# Patient Record
Sex: MALE | Race: WHITE | NOT HISPANIC OR LATINO | Employment: OTHER | URBAN - METROPOLITAN AREA
[De-identification: names, ages, dates, MRNs, and addresses within clinical notes are randomized per-mention and may not be internally consistent; named-entity substitution may affect disease eponyms.]

---

## 2018-07-09 ENCOUNTER — HOSPITAL ENCOUNTER (INPATIENT)
Facility: MEDICAL CENTER | Age: 58
LOS: 4 days | DRG: 439 | End: 2018-07-13
Attending: INTERNAL MEDICINE | Admitting: INTERNAL MEDICINE
Payer: COMMERCIAL

## 2018-07-09 PROCEDURE — A9270 NON-COVERED ITEM OR SERVICE: HCPCS | Performed by: STUDENT IN AN ORGANIZED HEALTH CARE EDUCATION/TRAINING PROGRAM

## 2018-07-09 PROCEDURE — 770006 HCHG ROOM/CARE - MED/SURG/GYN SEMI*

## 2018-07-09 PROCEDURE — 700102 HCHG RX REV CODE 250 W/ 637 OVERRIDE(OP): Performed by: STUDENT IN AN ORGANIZED HEALTH CARE EDUCATION/TRAINING PROGRAM

## 2018-07-09 PROCEDURE — 700105 HCHG RX REV CODE 258: Performed by: STUDENT IN AN ORGANIZED HEALTH CARE EDUCATION/TRAINING PROGRAM

## 2018-07-09 RX ORDER — OXYCODONE HYDROCHLORIDE 5 MG/1
5 TABLET ORAL EVERY 4 HOURS PRN
COMMUNITY
End: 2020-06-22

## 2018-07-09 RX ORDER — OXYCODONE HYDROCHLORIDE 5 MG/1
5 TABLET ORAL EVERY 4 HOURS PRN
Status: DISCONTINUED | OUTPATIENT
Start: 2018-07-09 | End: 2018-07-10

## 2018-07-09 RX ORDER — BISACODYL 10 MG
10 SUPPOSITORY, RECTAL RECTAL
Status: DISCONTINUED | OUTPATIENT
Start: 2018-07-09 | End: 2018-07-13 | Stop reason: HOSPADM

## 2018-07-09 RX ORDER — POLYETHYLENE GLYCOL 3350 17 G/17G
1 POWDER, FOR SOLUTION ORAL
Status: DISCONTINUED | OUTPATIENT
Start: 2018-07-09 | End: 2018-07-13 | Stop reason: HOSPADM

## 2018-07-09 RX ORDER — SODIUM CHLORIDE 9 MG/ML
INJECTION, SOLUTION INTRAVENOUS CONTINUOUS
Status: DISCONTINUED | OUTPATIENT
Start: 2018-07-09 | End: 2018-07-13 | Stop reason: HOSPADM

## 2018-07-09 RX ORDER — AMOXICILLIN 250 MG
2 CAPSULE ORAL 2 TIMES DAILY
Status: DISCONTINUED | OUTPATIENT
Start: 2018-07-09 | End: 2018-07-13 | Stop reason: HOSPADM

## 2018-07-09 RX ADMIN — OXYCODONE HYDROCHLORIDE 5 MG: 5 TABLET ORAL at 21:50

## 2018-07-09 RX ADMIN — SODIUM CHLORIDE: 9 INJECTION, SOLUTION INTRAVENOUS at 21:00

## 2018-07-09 ASSESSMENT — COPD QUESTIONNAIRES
DO YOU EVER COUGH UP ANY MUCUS OR PHLEGM?: YES, EVERY DAY
IN THE PAST 12 MONTHS DO YOU DO LESS THAN YOU USED TO BECAUSE OF YOUR BREATHING PROBLEMS: DISAGREE/UNSURE
DURING THE PAST 4 WEEKS HOW MUCH DID YOU FEEL SHORT OF BREATH: SOME OF THE TIME
HAVE YOU SMOKED AT LEAST 100 CIGARETTES IN YOUR ENTIRE LIFE: NO/DON'T KNOW
COPD SCREENING SCORE: 4

## 2018-07-09 ASSESSMENT — COGNITIVE AND FUNCTIONAL STATUS - GENERAL
MOBILITY SCORE: 24
SUGGESTED CMS G CODE MODIFIER DAILY ACTIVITY: CH
SUGGESTED CMS G CODE MODIFIER MOBILITY: CH
DAILY ACTIVITIY SCORE: 24

## 2018-07-09 ASSESSMENT — LIFESTYLE VARIABLES
ALCOHOL_USE: NO
EVER_SMOKED: YES
EVER_SMOKED: YES

## 2018-07-09 ASSESSMENT — PATIENT HEALTH QUESTIONNAIRE - PHQ9
2. FEELING DOWN, DEPRESSED, IRRITABLE, OR HOPELESS: NOT AT ALL
SUM OF ALL RESPONSES TO PHQ9 QUESTIONS 1 AND 2: 0
1. LITTLE INTEREST OR PLEASURE IN DOING THINGS: NOT AT ALL

## 2018-07-09 ASSESSMENT — PAIN SCALES - GENERAL
PAINLEVEL_OUTOF10: 8
PAINLEVEL_OUTOF10: 6

## 2018-07-09 NOTE — PROGRESS NOTES
Direct admit from [facility]. Accepted by Dr. Cunningham ([facility] team) for acute on chronic pancreatitis.  NO ADT ENTERED. PLEASE CALL R BLUE/BERG TEAM ON ARRIVAL FOR ORDERS. Pt coming by ground.

## 2018-07-10 PROBLEM — K86.1 ACUTE ON CHRONIC PANCREATITIS (HCC): Status: ACTIVE | Noted: 2018-07-10

## 2018-07-10 PROBLEM — G89.29 CHRONIC BACK PAIN: Status: ACTIVE | Noted: 2018-07-10

## 2018-07-10 PROBLEM — K85.90 ACUTE ON CHRONIC PANCREATITIS (HCC): Status: ACTIVE | Noted: 2018-07-10

## 2018-07-10 PROBLEM — R10.11 ABDOMINAL PAIN, CHRONIC, RIGHT UPPER QUADRANT: Status: ACTIVE | Noted: 2018-07-10

## 2018-07-10 PROBLEM — M54.9 CHRONIC BACK PAIN: Status: ACTIVE | Noted: 2018-07-10

## 2018-07-10 PROBLEM — G89.29 ABDOMINAL PAIN, CHRONIC, RIGHT UPPER QUADRANT: Status: ACTIVE | Noted: 2018-07-10

## 2018-07-10 LAB
ALBUMIN SERPL BCP-MCNC: 4.6 G/DL (ref 3.2–4.9)
ALBUMIN/GLOB SERPL: 1.7 G/DL
ALP SERPL-CCNC: 85 U/L (ref 30–99)
ALT SERPL-CCNC: 42 U/L (ref 2–50)
ANION GAP SERPL CALC-SCNC: 10 MMOL/L (ref 0–11.9)
AST SERPL-CCNC: 40 U/L (ref 12–45)
BILIRUB SERPL-MCNC: 0.9 MG/DL (ref 0.1–1.5)
BUN SERPL-MCNC: 13 MG/DL (ref 8–22)
CALCIUM SERPL-MCNC: 9.8 MG/DL (ref 8.5–10.5)
CHLORIDE SERPL-SCNC: 112 MMOL/L (ref 96–112)
CO2 SERPL-SCNC: 21 MMOL/L (ref 20–33)
CREAT SERPL-MCNC: 0.96 MG/DL (ref 0.5–1.4)
ERYTHROCYTE [DISTWIDTH] IN BLOOD BY AUTOMATED COUNT: 45.9 FL (ref 35.9–50)
GLOBULIN SER CALC-MCNC: 2.7 G/DL (ref 1.9–3.5)
GLUCOSE SERPL-MCNC: 84 MG/DL (ref 65–99)
HCT VFR BLD AUTO: 50.3 % (ref 42–52)
HGB BLD-MCNC: 16.2 G/DL (ref 14–18)
LIPASE SERPL-CCNC: 270 U/L (ref 11–82)
MCH RBC QN AUTO: 30.5 PG (ref 27–33)
MCHC RBC AUTO-ENTMCNC: 32.2 G/DL (ref 33.7–35.3)
MCV RBC AUTO: 94.5 FL (ref 81.4–97.8)
PLATELET # BLD AUTO: 382 K/UL (ref 164–446)
PMV BLD AUTO: 10.3 FL (ref 9–12.9)
POTASSIUM SERPL-SCNC: 4.3 MMOL/L (ref 3.6–5.5)
PROT SERPL-MCNC: 7.3 G/DL (ref 6–8.2)
RBC # BLD AUTO: 5.32 M/UL (ref 4.7–6.1)
SODIUM SERPL-SCNC: 143 MMOL/L (ref 135–145)
WBC # BLD AUTO: 11 K/UL (ref 4.8–10.8)

## 2018-07-10 PROCEDURE — 700102 HCHG RX REV CODE 250 W/ 637 OVERRIDE(OP): Performed by: STUDENT IN AN ORGANIZED HEALTH CARE EDUCATION/TRAINING PROGRAM

## 2018-07-10 PROCEDURE — A9270 NON-COVERED ITEM OR SERVICE: HCPCS | Performed by: STUDENT IN AN ORGANIZED HEALTH CARE EDUCATION/TRAINING PROGRAM

## 2018-07-10 PROCEDURE — 700111 HCHG RX REV CODE 636 W/ 250 OVERRIDE (IP)

## 2018-07-10 PROCEDURE — 770006 HCHG ROOM/CARE - MED/SURG/GYN SEMI*

## 2018-07-10 PROCEDURE — 36415 COLL VENOUS BLD VENIPUNCTURE: CPT

## 2018-07-10 PROCEDURE — 99222 1ST HOSP IP/OBS MODERATE 55: CPT | Mod: GC | Performed by: INTERNAL MEDICINE

## 2018-07-10 PROCEDURE — 85027 COMPLETE CBC AUTOMATED: CPT

## 2018-07-10 PROCEDURE — 94760 N-INVAS EAR/PLS OXIMETRY 1: CPT

## 2018-07-10 PROCEDURE — 83690 ASSAY OF LIPASE: CPT

## 2018-07-10 PROCEDURE — 700111 HCHG RX REV CODE 636 W/ 250 OVERRIDE (IP): Performed by: STUDENT IN AN ORGANIZED HEALTH CARE EDUCATION/TRAINING PROGRAM

## 2018-07-10 PROCEDURE — 700105 HCHG RX REV CODE 258: Performed by: STUDENT IN AN ORGANIZED HEALTH CARE EDUCATION/TRAINING PROGRAM

## 2018-07-10 PROCEDURE — 80053 COMPREHEN METABOLIC PANEL: CPT

## 2018-07-10 RX ORDER — ONDANSETRON 2 MG/ML
INJECTION INTRAMUSCULAR; INTRAVENOUS
Status: COMPLETED
Start: 2018-07-10 | End: 2018-07-10

## 2018-07-10 RX ORDER — OXYCODONE HYDROCHLORIDE 10 MG/1
10 TABLET ORAL EVERY 4 HOURS PRN
Status: DISCONTINUED | OUTPATIENT
Start: 2018-07-10 | End: 2018-07-13 | Stop reason: HOSPADM

## 2018-07-10 RX ORDER — ONDANSETRON 2 MG/ML
4 INJECTION INTRAMUSCULAR; INTRAVENOUS EVERY 4 HOURS PRN
Status: DISCONTINUED | OUTPATIENT
Start: 2018-07-10 | End: 2018-07-13 | Stop reason: HOSPADM

## 2018-07-10 RX ADMIN — ONDANSETRON HYDROCHLORIDE: 2 INJECTION, SOLUTION INTRAMUSCULAR; INTRAVENOUS at 16:30

## 2018-07-10 RX ADMIN — OXYCODONE HYDROCHLORIDE 5 MG: 5 TABLET ORAL at 02:12

## 2018-07-10 RX ADMIN — OXYCODONE HYDROCHLORIDE 10 MG: 10 TABLET ORAL at 13:07

## 2018-07-10 RX ADMIN — SODIUM CHLORIDE: 9 INJECTION, SOLUTION INTRAVENOUS at 02:15

## 2018-07-10 RX ADMIN — OXYCODONE HYDROCHLORIDE 10 MG: 10 TABLET ORAL at 08:27

## 2018-07-10 RX ADMIN — PROCHLORPERAZINE EDISYLATE 10 MG: 5 INJECTION INTRAMUSCULAR; INTRAVENOUS at 19:52

## 2018-07-10 ASSESSMENT — LIFESTYLE VARIABLES
SUBSTANCE_ABUSE: 0
EVER_SMOKED: YES

## 2018-07-10 ASSESSMENT — ENCOUNTER SYMPTOMS
SEIZURES: 0
NAUSEA: 0
BLURRED VISION: 0
VOMITING: 0
PALPITATIONS: 0
LOSS OF CONSCIOUSNESS: 0
WEIGHT LOSS: 0
SENSORY CHANGE: 0
CONSTIPATION: 0
HEADACHES: 0
CLAUDICATION: 0
COUGH: 0
DOUBLE VISION: 0
FLANK PAIN: 0
EYE DISCHARGE: 0
ORTHOPNEA: 0
SHORTNESS OF BREATH: 0
NERVOUS/ANXIOUS: 0
FALLS: 0
WHEEZING: 0
NAUSEA: 1
TREMORS: 0
PHOTOPHOBIA: 0
MYALGIAS: 0
HEMOPTYSIS: 0
DEPRESSION: 0
ABDOMINAL PAIN: 1
BLOOD IN STOOL: 0
STRIDOR: 0
SINUS PAIN: 0
WEAKNESS: 0
DIZZINESS: 0
SORE THROAT: 0
EYE PAIN: 0
VOMITING: 1
DIAPHORESIS: 0
FOCAL WEAKNESS: 0
MEMORY LOSS: 0
DIARRHEA: 0
INSOMNIA: 0
SPUTUM PRODUCTION: 0
SPEECH CHANGE: 0
TINGLING: 0
FEVER: 0
CHILLS: 0
BACK PAIN: 1
PND: 0
BRUISES/BLEEDS EASILY: 0
HEARTBURN: 0
NECK PAIN: 0
EYE REDNESS: 0
HALLUCINATIONS: 0

## 2018-07-10 ASSESSMENT — COPD QUESTIONNAIRES
COPD SCREENING SCORE: 5
DURING THE PAST 4 WEEKS HOW MUCH DID YOU FEEL SHORT OF BREATH: NONE/LITTLE OF THE TIME
DO YOU EVER COUGH UP ANY MUCUS OR PHLEGM?: YES, EVERY DAY
HAVE YOU SMOKED AT LEAST 100 CIGARETTES IN YOUR ENTIRE LIFE: YES

## 2018-07-10 ASSESSMENT — PAIN SCALES - GENERAL: PAINLEVEL_OUTOF10: 9

## 2018-07-10 NOTE — ASSESSMENT & PLAN NOTE
Acute on Chronic abdominal pain likely due to pancreatitis  - [facility]  - RUQ/epigastric pain  - MRCP done at the showed Pancreatic divisum with bile draining form minor papillae  - ERCP scheduled for 7/10/2018 for possible sphincterotomy   - NPO for Possible ERCP and pancreatitis  - Oxycodone 5 mg Q4 hrs for pain  - Consult GI  - Baseline CBC and CMP  - GI consult

## 2018-07-10 NOTE — CARE PLAN
Problem: Bowel/Gastric:  Goal: Normal bowel function is maintained or improved  Outcome: PROGRESSING AS EXPECTED  Pt is having regular bowel movements, pt declined stool softeners.    Problem: Skin Integrity  Goal: Risk for impaired skin integrity will decrease  Outcome: PROGRESSING AS EXPECTED  Pt's skin is intact, pt is able to turn himself.    Problem: Pain Management  Goal: Pain level will decrease to patient's comfort goal  Outcome: PROGRESSING AS EXPECTED  Pt reports pain to his abd and has chronic low back pain, pt receiving oxy prn, will monitor pain throughout night.

## 2018-07-10 NOTE — RESPIRATORY CARE
COPD EDUCATION by COPD CLINICAL EDUCATOR  7/10/2018 at 2:20 PM by Amber Jovel     Patient interviewed by COPD education team. Patient refused COPD program at this time.

## 2018-07-10 NOTE — PROGRESS NOTES
1959: pt very upset about requesting his cigarettes to be locked up, contacted charge nurse. Pt reporting pain to his abdomen and his lower back, no pain meds ordered, will contact doctor.    2007: pt agreed to give writer cigarettes, locked up in drawer. Pt's other belongings at bedside.    2012: paged [facility] for pain medication orders.    2033: [facility]  returned phone call, states are on way to see pt and then will order pain medication. Stated pt can have clear fluids until midnight and then NPO for ERCP tomorrow.

## 2018-07-10 NOTE — CARE PLAN
Problem: Nutritional:  Goal: Achieve adequate nutritional intake  Patient's diet will advance past clears and will consume >50% of meals  Outcome: PROGRESSING SLOWER THAN EXPECTED

## 2018-07-10 NOTE — SENIOR ADMIT NOTE
Senior Admission Note    In summary: Cheng Kline is a 57 y.o. male with past medical history of skin cancer (removed), pancreatic divisum, colonic polyps, and chronic pain that [facility] due [facility] on chronic abdominal pain and possible need for ERCP.   Patient reported pain on RUQ that sometimes is 10/10 in intensity. It radiates in straight line in the middle of abdomen. Also reported nausea and lightheadedness. No vomiting. [facility] and they recommended transfer here for ERCP.     Physical Exam   Constitutional:  oriented to person, place, and time. No distress.   HEENT: grossly normal   Cardiovascular: Normal heart rate, Normal rhythm   Lungs: Respiratory effort is normal. Normal breath sounds  Abdomen: Bowel sounds normal,Tenderness on right upper and lower quadrants   Skin: No erythema, No rash  Lower limbs: normal, no pitting edema   Neurologic: Alert & oriented x 3,  No focal deficits noted, cranial nerves II through XII are normal  PSY: anxious          Assessment and plan in summary:    #Acute on chronic abdominal pain   - [facility] and GI evaluated him and recommended transfer to [facility] for ERCP.   - MRCP done that showed Pancreatic divisum   - lipase at the    Plan   - admitted   - consult GI in the morning for ERCP   - pain management   - IV fluids   - NPO at midnight   - labs in the morning     #Chronic back pain   - continue home medications for pain control     #Joselin/Hypomania   - [facility]   - he refuse any treatment at that time         For full plan, please see Intern note for details   Chilango Abbott M.D.  PGY 2

## 2018-07-10 NOTE — H&P
"      Internal Medicine Admitting History and Physical    Note Author: Lane Restrepo M.D.       Name Cheng Kline     1960   Age/Sex 57 y.o. male   MRN [id number]   Code Status Full code     After 5PM or if no immediate response to page, please call for cross-coverage  Attending/Team: Marisa/Blue See Patient List for primary contact information  Call [phone] to page    1st Call - Day Intern (R1):   Gregory 2nd Call - Day Sr. Resident (R2/R3):   Oniel       Chief Complaint:   Abdominal pain    HPI:  56 YO [facility] RUQ/epigastric sharp pain that started on 2018 but has been intermittent since 2018. He also describes \"tender pain\" going from sternum to umbilicus along midline. [facility] showed pancreatic divisum with bile draining through minor papilla. [facility] scheduled for tomorrow, 7/10/2018.       Review of Systems   Constitutional: Negative for chills, diaphoresis, fever, malaise/fatigue and weight loss.   HENT: Negative for congestion, ear discharge, ear pain, hearing loss, nosebleeds, sinus pain, sore throat and tinnitus.    Eyes: Negative for blurred vision, double vision, photophobia, pain, discharge and redness.   Respiratory: Negative for cough, hemoptysis, sputum production, shortness of breath, wheezing and stridor.    Cardiovascular: Negative for chest pain, palpitations, orthopnea, claudication, leg swelling and PND.   Gastrointestinal: Positive for abdominal pain. Negative for blood in stool, constipation, diarrhea, heartburn, melena, nausea and vomiting.   Genitourinary: Negative for dysuria, flank pain, frequency, hematuria and urgency.   Musculoskeletal: Positive for back pain. Negative for falls, joint pain, myalgias and neck pain.   Skin: Negative for itching and rash.   Neurological: Negative for dizziness, tingling, tremors, sensory change, speech change, focal weakness, seizures, loss " "of consciousness, weakness and headaches.   Endo/Heme/Allergies: Does not bruise/bleed easily.   Psychiatric/Behavioral: Negative for depression, hallucinations, memory loss, substance abuse and suicidal ideas. The patient is not nervous/anxious and does not have insomnia.              Past Medical History (Chronic medical problem, known complications and current treatment)    Skin Cancer-excised  Chronic back pain  Colonic polyps  Diverticulosis  Hiatal hernia      Past Surgical History:  Past Surgical History:   Procedure Laterality Date   • OTHER ORTHOPEDIC SURGERY      herniated disks L1-S1       Current Outpatient Medications:  Home Medications     Reviewed by Ale Jennings R.N. (Registered Nurse) on 07/09/18 at 1949  Med List Status: Complete   Medication Last Dose Status   methocarbamol (ROBAXIN) 500 MG Tab 6/25/2018 Active   oxyCODONE immediate-release (ROXICODONE) 5 MG Tab 7/9/2018 Active                Medication Allergy/Sensitivities:  Allergies   Allergen Reactions   • Codeine Hives   • Tylenol Unspecified     \"Tylenol makes me violent.\"   • Darvocet [Propoxyphene N-Apap] Hives   • Elavil [Amitriptyline] Anaphylaxis   • Hydrocodone Anaphylaxis         Family History (mandatory)   Family History   Problem Relation Age of Onset   • Cancer Mother      Cholangiocarcinoma   • No Known Problems Father        Social History (mandatory)   Social History     Social History   • Marital status: Single     Spouse name: N/A   • Number of children: N/A   • Years of education: N/A     Occupational History   • Not on file.     Social History Main Topics   • Smoking status: Former Smoker     Types: Cigarettes   • Smokeless tobacco: Not on file   • Alcohol use No   • Drug use: Yes      Comment: marijuana   • Sexual activity: Not on file     Other Topics Concern   • Not on file     Social History Narrative   • No narrative on file     Living situation:  PCP : Pcp Pt States None    Physical Exam     Vitals:    07/09/18 " "1800 07/09/18 2001 07/10/18 0000   BP:  143/75 136/70   Pulse:  69 64   Resp:  14 14   Temp:  36.7 °C (98.1 °F) 37.1 °C (98.8 °F)   SpO2:  95% 95%   Weight: 95.4 kg (210 lb 5.1 oz)     Height: 1.803 m (5' 11\")       Body mass index is 29.33 kg/m².  /70   Pulse 64   Temp 37.1 °C (98.8 °F)   Resp 14   Ht 1.803 m (5' 11\")   Wt 95.4 kg (210 lb 5.1 oz)   SpO2 95%   BMI 29.33 kg/m²   O2 therapy: Pulse Oximetry: 95 %, O2 (LPM): 0, O2 Delivery: None (Room Air)    Physical Exam   Constitutional: He is oriented to person, place, and time and well-developed, well-nourished, and in no distress. No distress.   HENT:   Head: Normocephalic and atraumatic.   Mouth/Throat: Oropharynx is clear and moist. No oropharyngeal exudate.   Eyes: Conjunctivae and EOM are normal. Pupils are equal, round, and reactive to light. Right eye exhibits no discharge. Left eye exhibits no discharge. No scleral icterus.   Neck: Normal range of motion. Neck supple. No JVD present. No tracheal deviation present. No thyromegaly present.   Cardiovascular: Normal rate, regular rhythm, normal heart sounds and intact distal pulses.  Exam reveals no gallop and no friction rub.    No murmur heard.  Pulmonary/Chest: Effort normal and breath sounds normal. No stridor. No respiratory distress. He has no wheezes. He has no rales. He exhibits no tenderness.   Abdominal: Soft. Bowel sounds are normal. He exhibits no distension and no mass. There is tenderness. There is no rebound and no guarding.   Musculoskeletal: Normal range of motion. He exhibits no edema, tenderness or deformity.   Lymphadenopathy:     He has no cervical adenopathy.   Neurological: He is alert and oriented to person, place, and time.   Skin: Skin is warm and dry. No rash noted. He is not diaphoretic. No erythema. No pallor.   Psychiatric: Mood and memory normal.   Tangental speech         Data Review       Old Records Request:   Completed  Current Records review/summary: " "Completed    Lab Data Review:  No results found for this or any previous visit (from the past 24 hour(s)).    Imaging/Procedures Review:    Independant Imaging Review: Completed  No orders to display            EKG:   EKG Independant Review: Completed  QTc:, HR:, Normal Sinus Rhythm, no ST/T changes     Records reviewed and summarized in current documentation :  Yes  [facility] teaching service handout given to patient:  No         Assessment/Plan     Chronic back pain   Assessment & Plan    Pt reports of having persistent back pain for many years  - He reports being seen by orthopedics and they recommended \"steroid\" injections  - He tried it once and the pain has gotten         Abdominal pain, chronic, right upper quadrant   Assessment & Plan    Acute on Chronic abdominal pain likely due to pancreatitis  - [facility]  - RUQ/epigastric pain  - MRCP done at the showed Pancreatic divisum with bile draining form minor papillae  - ERCP scheduled for 7/10/2018 for possible sphincterotomy   - NPO for Possible ERCP and pancreatitis  - Oxycodone 5 mg Q4 hrs for pain  - Consult GI  - Baseline CBC and CMP  - GI consult            Anticipated Hospital stay:  >2 midnights        Quality Measures  Quality-Core Measures   Reviewed items::  Labs reviewed, Medications reviewed and Radiology images reviewed  Multani catheter::  No Multani    PCP: Pcp Pt States None    "

## 2018-07-10 NOTE — DIETARY
Nutrition Services: Day 1 of admit.  Cheng Kline is a 57 y.o. male with admitting DX of acute on chronic pancreatitis, Acute pancreatitis  Consult received for Poor PO on Nutrition Admit Screen     Pt states he tolerated the clear liquids well this morning for breakfast. Pt states he has been NPO 4 out the last 7 days. Pt unsure of wt loss or UBW, however pt stated his clothes fit the same.     Assessment:  Ht: 180.3 cm, Wt 7/9: 95.4 kg via bed scale, BMI: 29.33  Diet/Intake: Clear Liquids, No Red Foods - No PO documented in chart yet.     Evaluation:   1. Meds: pericolace (refused)  2. Last BM: 7/9    Recommendations/Plan:  1. Advance diet past clears when feasible.    2. Encourage intake of meals  3. Document intake of all meals as % taken in ADL's to provide interdisciplinary communication across all shifts.   4. Monitor weight.  5. Nutrition rep will continue to see patient for ongoing meal and snack preferences.  6. Obtain supplement order per RD as needed.    RD following

## 2018-07-10 NOTE — NON-PROVIDER
Internal Medicine Medical Student Note  Note Author: Bhavesh Hopper, Student    Name Cheng Kline     1960   Age/Sex 57 y.o. male   MRN [id number]   Code Status FULL             Reason for interval visit  (Principal Problem)   Abdominal pain, chronic, right upper quadrant    Interval Problem Daily Status Update  (problem status, last 24 hours, new history, new data )   Patient reports his pain has bene improving, but is still having some aches. He states he is usually given 10 mg oxycodone instead of the 5 mg he has been receiving here. He states he has otherwise been feeling better and is only upset that he has not been able to get any sleep due to being woken up frequently.        Physical Exam       Vitals:    07/10/18 0400 07/10/18 0717 07/10/18 1125 07/10/18 1219   BP: 155/85 150/65  151/76   Pulse: 69 65 72 64   Resp: 16 16 18 16   Temp: 37.1 °C (98.8 °F) 36.5 °C (97.7 °F)  36.3 °C (97.3 °F)   SpO2: 95% 96% 95% 97%   Weight:       Height:         Body mass index is 29.33 kg/m². Weight: 95.4 kg (210 lb 5.1 oz)  Oxygen Therapy:  Pulse Oximetry: 97 %, O2 (LPM): 0, FiO2%: 21 %, O2 Delivery: None (Room Air)    Physical Exam   Constitutional: He is oriented to person, place, and time and well-developed, well-nourished, and in no distress. No distress.   HENT:   Head: Normocephalic.   Eyes: Conjunctivae are normal. No scleral icterus.   Cardiovascular: Normal rate, regular rhythm, normal heart sounds and intact distal pulses.  Exam reveals no gallop and no friction rub.    No murmur heard.  Pulmonary/Chest: Effort normal and breath sounds normal. No respiratory distress. He has no wheezes. He has no rales. He exhibits no tenderness.   Abdominal: Soft. Bowel sounds are normal. He exhibits no distension. There is no tenderness. There is no guarding.   Neurological: He is alert and oriented to person, place, and time.   Skin: Skin is warm and dry. No rash (on visualized skin) noted. He is not  diaphoretic.         Assessment/Plan     #Acute on chronic abdominal pain, likely due to pancreatitis  · Patient has bene scheduled for ERCP. GI was consulted and spoken with. He states they will likely be able to do the ERCP tomorrow. Will reassess following results of ERCP and speak with patient about plan moving forward.  · PLAN:  · ERCP scheduled for tomorrow  · Continue oxycodone 10 mg q4h PRN for pain  · Will place on clear liquid diet  · Follow up with GI tomorrow regarding ERCP results    #Chronic back pain  · Patient states his pain will be controlled with the oxycodone given for his abdominal pain  · PLAN:  · Continue medications as written  · Reassess tomorrow

## 2018-07-10 NOTE — ASSESSMENT & PLAN NOTE
"Pt reports of having persistent back pain for many years  - He reports being seen by orthopedics and they recommended \"steroid\" injections  - He tried it once and the pain has gotten   - He manages it with Mariajuana and oxycodone at home  - Possible opiate dependence  "

## 2018-07-10 NOTE — CONSULTS
Gastroenterology ([facility]) Progress Note    Name Cheng Kline     1960   Age/Sex 57 y.o. male   MRN [id number]           Reason for consult  Pancreatic divisim     Impression   A 58 yo M with past medical history significant for recently diagnosed pancreatic divisim in setting of persistent abdominal symptoms and an attack of pancreatitis last year. He was evaluated initially at [facility] and was transferred to [facility] for possible endoscopic intervention. Unclear etiology for his chronic abdominal pain, suspect chronic pancreatitis, vs dysfunctional abdominal pain vs pancreatic divisim related.     Recommendations   - Check secretin enhanced MRCP  - Check lipase level   - Okay to continue clear liquid diet   - Anti-emetic as needed   - Will review results of MRCP and decide on ERCP     HPI   Mr. Kline is a 58 yo M  with a past medical history significant for degenerative disc disease, chronic back pain, colonic polyps and recently diagnosed pancreatic divisim (s/p acute pancreatitis last year), [facility]. Patient complains of chronic epigastric pain that persists since his last attack of pancreatitis last year, associated with intermittent nausea and recent attack of clear emesis but no reported hematemesis, melena, weight loss, chronic diarrhea or recent trauma. He had lap osiel back in , denies etoh intake, history of DM or DLD, admits to cigarette smoking. Workup at [facility] was significant for lipase level of 171 with last MRCP back in 2017 that revealed pancreatic divisim.      Review of Systems   Constitutional: Negative for chills and fever.   Respiratory: Negative for cough and shortness of breath.    Cardiovascular: Negative for chest pain.   Gastrointestinal: Positive for abdominal pain, nausea and vomiting. Negative for blood in stool, diarrhea and melena.   Genitourinary: Negative for dysuria.   Musculoskeletal: Positive for  back pain and joint pain.   Neurological: Negative for dizziness and loss of consciousness.   Psychiatric/Behavioral: Negative for substance abuse.     Quality Measures  Quality-Core Measures        Physical Exam       Vitals:    07/09/18 2001 07/10/18 0000 07/10/18 0400 07/10/18 0717   BP: 143/75 136/70 155/85 150/65   Pulse: 69 64 69 65   Resp: 14 14 16 16   Temp: 36.7 °C (98.1 °F) 37.1 °C (98.8 °F) 37.1 °C (98.8 °F) 36.5 °C (97.7 °F)   SpO2: 95% 95% 95% 96%   Weight:       Height:         Body mass index is 29.33 kg/m². Weight: 95.4 kg (210 lb 5.1 oz)  Oxygen Therapy:  Pulse Oximetry: 96 %, O2 (LPM): 0, O2 Delivery: None (Room Air)    Physical Exam   Constitutional: He is oriented to person, place, and time and well-developed, well-nourished, and in no distress. No distress.   HENT:   Head: Normocephalic and atraumatic.   Eyes: No scleral icterus.   Cardiovascular: Normal rate, regular rhythm and normal heart sounds.    Pulmonary/Chest: Breath sounds normal. No stridor. No respiratory distress.   Abdominal: Soft. Bowel sounds are normal. He exhibits no distension. There is tenderness. There is no guarding.   Neurological: He is alert and oriented to person, place, and time. GCS score is 15.   Skin: Skin is warm and dry. He is not diaphoretic.       Lab Data Review:      7/10/2018  11:26 AM    Recent Labs      07/10/18   0551   SODIUM  143   POTASSIUM  4.3   CHLORIDE  112   CO2  21   BUN  13   CREATININE  0.96   CALCIUM  9.8       Recent Labs      07/10/18   0551   ALTSGPT  42   ASTSGOT  40   ALKPHOSPHAT  85   TBILIRUBIN  0.9   GLUCOSE  84       Recent Labs      07/10/18   0551   RBC  5.32   HEMOGLOBIN  16.2   HEMATOCRIT  50.3   PLATELETCT  382       Recent Labs      07/10/18   0551   WBC  11.0*   ASTSGOT  40   ALTSGPT  42   ALKPHOSPHAT  85   TBILIRUBIN  0.9

## 2018-07-11 PROBLEM — Q45.3 PANCREATIC DIVISUM: Status: ACTIVE | Noted: 2018-07-11

## 2018-07-11 LAB
ALBUMIN SERPL BCP-MCNC: 4.3 G/DL (ref 3.2–4.9)
ALBUMIN/GLOB SERPL: 1.8 G/DL
ALP SERPL-CCNC: 102 U/L (ref 30–99)
ALT SERPL-CCNC: 68 U/L (ref 2–50)
ANION GAP SERPL CALC-SCNC: 9 MMOL/L (ref 0–11.9)
AST SERPL-CCNC: 53 U/L (ref 12–45)
BILIRUB SERPL-MCNC: 0.7 MG/DL (ref 0.1–1.5)
BUN SERPL-MCNC: 10 MG/DL (ref 8–22)
CALCIUM SERPL-MCNC: 9.4 MG/DL (ref 8.5–10.5)
CHLORIDE SERPL-SCNC: 112 MMOL/L (ref 96–112)
CHOLEST SERPL-MCNC: 163 MG/DL (ref 100–199)
CO2 SERPL-SCNC: 19 MMOL/L (ref 20–33)
CREAT SERPL-MCNC: 0.83 MG/DL (ref 0.5–1.4)
GLOBULIN SER CALC-MCNC: 2.4 G/DL (ref 1.9–3.5)
GLUCOSE SERPL-MCNC: 106 MG/DL (ref 65–99)
HDLC SERPL-MCNC: 34 MG/DL
LDLC SERPL CALC-MCNC: 109 MG/DL
POTASSIUM SERPL-SCNC: 4.1 MMOL/L (ref 3.6–5.5)
PROT SERPL-MCNC: 6.7 G/DL (ref 6–8.2)
SODIUM SERPL-SCNC: 140 MMOL/L (ref 135–145)
TRIGL SERPL-MCNC: 102 MG/DL (ref 0–149)

## 2018-07-11 PROCEDURE — A9270 NON-COVERED ITEM OR SERVICE: HCPCS

## 2018-07-11 PROCEDURE — 700111 HCHG RX REV CODE 636 W/ 250 OVERRIDE (IP): Performed by: INTERNAL MEDICINE

## 2018-07-11 PROCEDURE — 80061 LIPID PANEL: CPT

## 2018-07-11 PROCEDURE — 99232 SBSQ HOSP IP/OBS MODERATE 35: CPT | Mod: GC | Performed by: INTERNAL MEDICINE

## 2018-07-11 PROCEDURE — 700102 HCHG RX REV CODE 250 W/ 637 OVERRIDE(OP): Performed by: STUDENT IN AN ORGANIZED HEALTH CARE EDUCATION/TRAINING PROGRAM

## 2018-07-11 PROCEDURE — 700102 HCHG RX REV CODE 250 W/ 637 OVERRIDE(OP): Performed by: INTERNAL MEDICINE

## 2018-07-11 PROCEDURE — 80053 COMPREHEN METABOLIC PANEL: CPT

## 2018-07-11 PROCEDURE — A9270 NON-COVERED ITEM OR SERVICE: HCPCS | Performed by: INTERNAL MEDICINE

## 2018-07-11 PROCEDURE — 770006 HCHG ROOM/CARE - MED/SURG/GYN SEMI*

## 2018-07-11 PROCEDURE — A9270 NON-COVERED ITEM OR SERVICE: HCPCS | Performed by: STUDENT IN AN ORGANIZED HEALTH CARE EDUCATION/TRAINING PROGRAM

## 2018-07-11 PROCEDURE — 82787 IGG 1 2 3 OR 4 EACH: CPT | Mod: 91

## 2018-07-11 PROCEDURE — 36415 COLL VENOUS BLD VENIPUNCTURE: CPT

## 2018-07-11 PROCEDURE — 700102 HCHG RX REV CODE 250 W/ 637 OVERRIDE(OP)

## 2018-07-11 PROCEDURE — 86038 ANTINUCLEAR ANTIBODIES: CPT

## 2018-07-11 RX ORDER — PROMETHAZINE HYDROCHLORIDE 25 MG/1
TABLET ORAL
Status: ACTIVE
Start: 2018-07-11 | End: 2018-07-11

## 2018-07-11 RX ORDER — PROMETHAZINE HYDROCHLORIDE 25 MG/1
25 TABLET ORAL EVERY 6 HOURS PRN
Status: DISCONTINUED | OUTPATIENT
Start: 2018-07-11 | End: 2018-07-13 | Stop reason: HOSPADM

## 2018-07-11 RX ORDER — OMEPRAZOLE 20 MG/1
40 CAPSULE, DELAYED RELEASE ORAL DAILY
Status: DISCONTINUED | OUTPATIENT
Start: 2018-07-12 | End: 2018-07-13 | Stop reason: HOSPADM

## 2018-07-11 RX ORDER — PROMETHAZINE HYDROCHLORIDE 25 MG/1
TABLET ORAL
Status: COMPLETED
Start: 2018-07-11 | End: 2018-07-11

## 2018-07-11 RX ADMIN — PROMETHAZINE HYDROCHLORIDE 25 MG: 25 TABLET ORAL at 09:30

## 2018-07-11 RX ADMIN — OXYCODONE HYDROCHLORIDE 10 MG: 10 TABLET ORAL at 19:26

## 2018-07-11 RX ADMIN — ONDANSETRON 4 MG: 2 INJECTION, SOLUTION INTRAMUSCULAR; INTRAVENOUS at 07:10

## 2018-07-11 RX ADMIN — PROMETHAZINE HYDROCHLORIDE 25 MG: 25 TABLET ORAL at 19:25

## 2018-07-11 RX ADMIN — PANCRELIPASE 6000 UNITS: 30000; 6000; 19000 CAPSULE, DELAYED RELEASE PELLETS ORAL at 19:26

## 2018-07-11 RX ADMIN — ONDANSETRON 4 MG: 2 INJECTION, SOLUTION INTRAMUSCULAR; INTRAVENOUS at 01:07

## 2018-07-11 RX ADMIN — OXYCODONE HYDROCHLORIDE 10 MG: 10 TABLET ORAL at 23:48

## 2018-07-11 RX ADMIN — OXYCODONE HYDROCHLORIDE 10 MG: 10 TABLET ORAL at 00:33

## 2018-07-11 ASSESSMENT — ENCOUNTER SYMPTOMS
SHORTNESS OF BREATH: 0
DEPRESSION: 0
MYALGIAS: 0
DIARRHEA: 0
BLURRED VISION: 0
HEARTBURN: 0
BLOOD IN STOOL: 0
TINGLING: 0
LOSS OF CONSCIOUSNESS: 0
PALPITATIONS: 0
DIZZINESS: 0
DOUBLE VISION: 0
SPUTUM PRODUCTION: 0
FEVER: 0
CHILLS: 0
VOMITING: 1
NAUSEA: 1
HEADACHES: 0
COUGH: 0
DIZZINESS: 1
ABDOMINAL PAIN: 1
BACK PAIN: 1
HEMOPTYSIS: 0

## 2018-07-11 ASSESSMENT — PAIN SCALES - GENERAL
PAINLEVEL_OUTOF10: 9
PAINLEVEL_OUTOF10: 7

## 2018-07-11 ASSESSMENT — LIFESTYLE VARIABLES: SUBSTANCE_ABUSE: 0

## 2018-07-11 NOTE — PROGRESS NOTES
Assumed care of pt at 1900. AAOx4, RA. Clear liquid diet, no reds. Pt c/o nausea, received orders and medicated per MAR. Pt noted some relief. Updated on POC. Hourly rounding in place.

## 2018-07-11 NOTE — NON-PROVIDER
[facility] Internal Medicine Interval Note    Name Cheng Kline     1960   Age/Sex 57 y.o. male   MRN [id number]   Code Status Full     After 5PM or if no immediate response to page, please call for cross-coverage  Attending/Team: Dr. Cunningham/Jacob Call [phone] to page   1st Call - Day Intern (R1):   Dr. Jenkins 2nd Call - Day Sr. Resident (R2/R3):   Dr. Engle     Chief complaint/ reason for interval visit (Primary Diagnosis)    Raisa is a 56 yo M who [facility].     Interval Problem Daily Status Update    Active Hospital Problems    Diagnosis   • *Acute on chronic pancreatitis (HCC) [K85.90, K86.1]     Pancreatitis: History obtained after several attempts to redirect the patient to focus on abdominal pain. Patient states that he is having worsening abdominal pain in the RUQ; this has been ongoing for 1 year. Patient was frustrated with pain management as he was [facility]. Lipase was 171. Patient believes there is a hernia in the RUQ.     Review of Systems   HENT: Negative for hearing loss.    Cardiovascular: Negative for chest pain.   Gastrointestinal: Positive for abdominal pain.   Musculoskeletal: Positive for back pain and joint pain.       Consultants/Specialty  GI    Disposition  Inpatient    Quality Measures  Quality-Core Measures        Physical Exam       Vitals:    07/10/18 0400 07/10/18 0717 07/10/18 1125 07/10/18 1219   BP: 155/85 150/65  151/76   Pulse: 69 65 72 64   Resp: 16 16 18 16   Temp: 37.1 °C (98.8 °F) 36.5 °C (97.7 °F)  36.3 °C (97.3 °F)   SpO2: 95% 96% 95% 97%   Weight:       Height:         Body mass index is 29.33 kg/m².    Oxygen Therapy:  Pulse Oximetry: 97 %, O2 (LPM): 0, O2 Delivery: None (Room Air)    Physical Exam   Constitutional: He is well-developed, well-nourished, and in no distress.   HENT:   Head: Normocephalic.   Eyes: EOM are normal.   Cardiovascular:  Normal rate, regular rhythm and normal heart sounds.    Pulmonary/Chest: Effort normal.   Abdominal: Soft. Bowel sounds are normal. He exhibits no mass. There is tenderness. There is no rebound and no guarding.   No hernia appreciated in RUQ per patient concerns   Psychiatric:   Difficult to keep focused; circumstantial vs tangential thought process at times         Lab Data Review:      7/10/2018  6:16 PM    Recent Labs      07/10/18   0551   SODIUM  143   POTASSIUM  4.3   CHLORIDE  112   CO2  21   BUN  13   CREATININE  0.96   CALCIUM  9.8       Recent Labs      07/10/18   0551   ALTSGPT  42   ASTSGOT  40   ALKPHOSPHAT  85   TBILIRUBIN  0.9   LIPASE  270*   GLUCOSE  84       Recent Labs      07/10/18   0551   RBC  5.32   HEMOGLOBIN  16.2   HEMATOCRIT  50.3   PLATELETCT  382       Recent Labs      07/10/18   0551   WBC  11.0*   ASTSGOT  40   ALTSGPT  42   ALKPHOSPHAT  85   TBILIRUBIN  0.9           Assessment/Plan     #Acute on chronic pancreatitis  - Patient denies hx of alcohol use; [facility] of pancreatic divisum  - Lipase of 11 on admission  - GI consulted with recommendations, below    Plan:  - Consider MRCP with secretin stimulation if not performed at [facility] (this would help visualize endocrine function of the pancreas) possible ERCP if pancreatitis resolving  - Repeat lipase  - IVF At 100cc/hr   - Ondansetron 4mg IV q4h prn for nausea  - Oxy (10mg q4h prn) for pain   - JACINTO and IgG4 levels for possible autoimmune pancreatitis causes    #Chronic back pain  - Continue oxy for pain

## 2018-07-11 NOTE — NON-PROVIDER
Internal Medicine Medical Student Note  Note Author: Bhavesh Hopper, Student    Name Cheng Kline     1960   Age/Sex 57 y.o. male   MRN [id number]   Code Status FULL             Reason for interval visit  (Principal Problem)   Acute on chronic pancreatitis (HCC)    Interval Problem Daily Status Update  (problem status, last 24 hours, new history, new data )   Patient reports his pain has improved significantly. He states he has not had any recurrent abdominal pain or diarrhea. Pt reports nausea due to recent onset vertigo. He states he has had around a dozen episodes of mostly yellow liquid emesis. Pt is awaiting MRCP.        Physical Exam       Vitals:    07/10/18 1219 07/10/18 2000 18 0400 18 1200   BP: 151/76 143/57 139/40 125/65   Pulse: 64 (!) 55 63 74   Resp: 16 14 16 17   Temp: 36.3 °C (97.3 °F) 36.6 °C (97.8 °F) 36.4 °C (97.5 °F) 36.4 °C (97.6 °F)   SpO2: 97% 96% 95% 98%   Weight:       Height:         Body mass index is 29.33 kg/m².    Oxygen Therapy:  Pulse Oximetry: 98 %, O2 (LPM): 0, O2 Delivery: None (Room Air)    Physical Exam   Constitutional: He is oriented to person, place, and time and well-developed, well-nourished, and in no distress.   HENT:   Head: Normocephalic.   Eyes: Conjunctivae are normal. Pupils are equal, round, and reactive to light. No scleral icterus.   Cardiovascular: Normal rate, regular rhythm, normal heart sounds and intact distal pulses.  Exam reveals no gallop and no friction rub.    No murmur heard.  Pulmonary/Chest: Effort normal and breath sounds normal. No respiratory distress. He has no wheezes. He has no rales. He exhibits no tenderness.   Abdominal: Soft. Bowel sounds are normal. He exhibits no distension. There is no tenderness. There is no rebound and no guarding.   Neurological: He is alert and oriented to person, place, and time.   Skin: Skin is warm and dry. No rash (on visualized skin) noted.         Assessment/Plan     #Acute on  chronic pancreatitis  · Pain is currently under control. GI has been consulted and believes this may be an autoimmune process. JACINTO and IgG pending. MRCP scheduled for today.  · PLAN:  · Awaiting MRCP. Consult GI about results. Reassess after.  · Continue oxycodone 10 mg, PO, q4 PRN for pain  · Continue clear liquid diet, no red liquids until after MRCP/ERCP studies    #Chronic back pain  · Pain currently controlled. Will reassess tomorrow.    #Vertigo  · Patient was sleeping when going to reassess. Per nurse, patient was feeling better and has been resting comfortably. Will reassess for continued nausea and vertigo tomorrow.

## 2018-07-11 NOTE — CONSULTS
Gastroenterology (GIC) Progress Note    Name Cheng Kline     1960   Age/Sex 57 y.o. male   MRN [id number]           Reason for consult  Pancreatic divisim     Impression   A 58 yo M with past medical history significant for recently diagnosed pancreatic divisim in setting of persistent abdominal symptoms and an attack of pancreatitis last year. He was evaluated initially at [facility] and was transferred to [facility] for possible endoscopic intervention. Unclear etiology for his chronic abdominal pain, suspect chronic pancreatitis, vs dysfunctional abdominal pain vs pancreatic divisim vs cyclic vomiting syndrome.      Recommendations   - Pending secretin enhanced MRCP  - Continue supportive therapy   - Obtain GI records from [facility]   - Will review results of MRCP and decide on ERCP   - Will continue to follow     Interval history   - NAEO   - Persistent nausea and abdominal pain   - No reported emesis   - Elevated lipase of 270       Review of Systems   Constitutional: Negative for chills and fever.   Respiratory: Negative for cough and shortness of breath.    Cardiovascular: Negative for chest pain.   Gastrointestinal: Positive for abdominal pain, nausea and vomiting. Negative for blood in stool, diarrhea and melena.   Genitourinary: Negative for dysuria.   Musculoskeletal: Positive for back pain and joint pain.   Neurological: Negative for dizziness and loss of consciousness.   Psychiatric/Behavioral: Negative for substance abuse.     Quality Measures  Quality-Core Measures        Physical Exam       Vitals:    07/10/18 1125 07/10/18 1219 07/10/18 2000 18 0400   BP:  151/76 143/57 139/40   Pulse: 72 64 (!) 55 63   Resp: 18 16 14 16   Temp:  36.3 °C (97.3 °F) 36.6 °C (97.8 °F) 36.4 °C (97.5 °F)   SpO2: 95% 97% 96% 95%   Weight:       Height:         Body mass index is 29.33 kg/m².    Oxygen Therapy:  Pulse Oximetry: 95 %, O2 (LPM): 0, O2 Delivery: None (Room Air)    Physical Exam    Constitutional: He is oriented to person, place, and time and well-developed, well-nourished, and in no distress. No distress.   HENT:   Head: Normocephalic and atraumatic.   Eyes: No scleral icterus.   Cardiovascular: Normal rate, regular rhythm and normal heart sounds.    Pulmonary/Chest: Breath sounds normal. No stridor. No respiratory distress.   Abdominal: Soft. Bowel sounds are normal. He exhibits no distension. There is tenderness. There is no guarding.   Neurological: He is alert and oriented to person, place, and time. GCS score is 15.   Skin: Skin is warm and dry. He is not diaphoretic.       Lab Data Review:      7/10/2018  11:26 AM    Recent Labs      07/10/18   0551  07/11/18 0327   SODIUM  143  140   POTASSIUM  4.3  4.1   CHLORIDE  112  112   CO2  21  19*   BUN  13  10   CREATININE  0.96  0.83   CALCIUM  9.8  9.4       Recent Labs      07/10/18   0551  07/11/18 0327   ALTSGPT  42  68*   ASTSGOT  40  53*   ALKPHOSPHAT  85  102*   TBILIRUBIN  0.9  0.7   LIPASE  270*   --    GLUCOSE  84  106*       Recent Labs      07/10/18   0551   RBC  5.32   HEMOGLOBIN  16.2   HEMATOCRIT  50.3   PLATELETCT  382       Recent Labs      07/10/18   0551  07/11/18 0327   WBC  11.0*   --    ASTSGOT  40  53*   ALTSGPT  42  68*   ALKPHOSPHAT  85  102*   TBILIRUBIN  0.9  0.7

## 2018-07-12 ENCOUNTER — APPOINTMENT (OUTPATIENT)
Dept: RADIOLOGY | Facility: MEDICAL CENTER | Age: 58
DRG: 439 | End: 2018-07-12
Attending: INTERNAL MEDICINE
Payer: COMMERCIAL

## 2018-07-12 LAB
IGG1 SER-MCNC: 549 MG/DL (ref 240–1118)
IGG2 SER-MCNC: 142 MG/DL (ref 124–549)
IGG3 SER-MCNC: 30 MG/DL (ref 21–134)
IGG4 SER-MCNC: 8 MG/DL (ref 1–123)
NUCLEAR IGG SER QL IA: NORMAL

## 2018-07-12 PROCEDURE — A9270 NON-COVERED ITEM OR SERVICE: HCPCS | Performed by: INTERNAL MEDICINE

## 2018-07-12 PROCEDURE — 74181 MRI ABDOMEN W/O CONTRAST: CPT

## 2018-07-12 PROCEDURE — 700105 HCHG RX REV CODE 258: Performed by: STUDENT IN AN ORGANIZED HEALTH CARE EDUCATION/TRAINING PROGRAM

## 2018-07-12 PROCEDURE — 700102 HCHG RX REV CODE 250 W/ 637 OVERRIDE(OP): Performed by: STUDENT IN AN ORGANIZED HEALTH CARE EDUCATION/TRAINING PROGRAM

## 2018-07-12 PROCEDURE — 770006 HCHG ROOM/CARE - MED/SURG/GYN SEMI*

## 2018-07-12 PROCEDURE — 99232 SBSQ HOSP IP/OBS MODERATE 35: CPT | Mod: GC | Performed by: INTERNAL MEDICINE

## 2018-07-12 PROCEDURE — 700102 HCHG RX REV CODE 250 W/ 637 OVERRIDE(OP): Performed by: INTERNAL MEDICINE

## 2018-07-12 PROCEDURE — A9270 NON-COVERED ITEM OR SERVICE: HCPCS | Performed by: STUDENT IN AN ORGANIZED HEALTH CARE EDUCATION/TRAINING PROGRAM

## 2018-07-12 RX ORDER — MORPHINE SULFATE 4 MG/ML
1 INJECTION, SOLUTION INTRAMUSCULAR; INTRAVENOUS ONCE
Status: ACTIVE | OUTPATIENT
Start: 2018-07-12 | End: 2018-07-13

## 2018-07-12 RX ADMIN — OXYCODONE HYDROCHLORIDE 10 MG: 10 TABLET ORAL at 16:31

## 2018-07-12 RX ADMIN — SODIUM CHLORIDE: 9 INJECTION, SOLUTION INTRAVENOUS at 20:47

## 2018-07-12 RX ADMIN — SODIUM CHLORIDE: 9 INJECTION, SOLUTION INTRAVENOUS at 05:27

## 2018-07-12 RX ADMIN — OXYCODONE HYDROCHLORIDE 10 MG: 10 TABLET ORAL at 11:07

## 2018-07-12 RX ADMIN — OXYCODONE HYDROCHLORIDE 10 MG: 10 TABLET ORAL at 20:42

## 2018-07-12 RX ADMIN — PANCRELIPASE 6000 UNITS: 30000; 6000; 19000 CAPSULE, DELAYED RELEASE PELLETS ORAL at 17:30

## 2018-07-12 ASSESSMENT — ENCOUNTER SYMPTOMS
NAUSEA: 1
ABDOMINAL PAIN: 1
VOMITING: 0
DOUBLE VISION: 0
BLOOD IN STOOL: 0
FEVER: 0
TINGLING: 0
DIARRHEA: 0
BLURRED VISION: 0
SPUTUM PRODUCTION: 0
PALPITATIONS: 0
CHILLS: 0
HEADACHES: 0
HEMOPTYSIS: 0
MYALGIAS: 0
COUGH: 0
HEARTBURN: 0
BACK PAIN: 1
DEPRESSION: 0

## 2018-07-12 ASSESSMENT — PAIN SCALES - GENERAL: PAINLEVEL_OUTOF10: 8

## 2018-07-12 NOTE — PROGRESS NOTES
Assumed care of pt at 1900. AAOx4, RA. Pt c/o abd and back pain, medicated per MAR. Pt c/o nausea, medicated per MAR. Expressed frustration about not being able to smoke while in the hospital. Educated the [facility] is a non-smoking campus. Asked pt if he would like nicotine patch or gum, pt declined. Resting in bed, hourly rounding in place.

## 2018-07-12 NOTE — PROGRESS NOTES
Internal Medicine Interval Note  Note Author: Michelle Jenkins M.D.     Name Cheng Kline     1960   Age/Sex 57 y.o. male   MRN [id number]   Code Status Full     After 5PM or if no immediate response to page, please call for cross-coverage  Attending/Team: [employer] See Patient List for primary contact information  Call [phone] to page    1st Call - Day Intern (R1):   Dr Jenkins 2nd Call - Day Sr. Resident (R2/R3):   Dr. Engle         Reason for interval visit  (Principal Problem)   Acute on chronic pancreatitis    Interval Problem Daily Status Update  (24 hours, problem oriented, brief subjective history, new lab/imaging data pertinent to that problem)   Overnight: Nothing significant.    Subjective: Patient was upset for delay in procedure. No new complains  Today's events:  Patient scheduled for MRCP today. GI will decide on further intervention plan based on MRCP result.    Review of Systems   Constitutional: Negative for chills and fever.   HENT: Negative for hearing loss and tinnitus.    Eyes: Negative for blurred vision and double vision.   Respiratory: Negative for cough, hemoptysis and sputum production.    Cardiovascular: Negative for chest pain, palpitations and leg swelling.   Gastrointestinal: Positive for abdominal pain and nausea. Negative for heartburn and vomiting.   Genitourinary: Negative for dysuria and urgency.   Musculoskeletal: Positive for back pain. Negative for myalgias.   Skin: Negative for itching and rash.   Neurological: Negative for tingling and headaches.   Psychiatric/Behavioral: Negative for depression and suicidal ideas.     Disposition/Barriers to discharge:   Inpatient for possible ERCP/ none    Consultants/Specialty  GI: Dr Tanner    Quality Measures  Quality-Core Measures   Reviewed items::  Labs reviewed, Medications reviewed and Radiology images reviewed  Multani catheter::  No Multani  DVT prophylaxis - mechanical:  SCDs      Physical Exam        Vitals:    07/11/18 1600 07/11/18 2000 07/12/18 0000 07/12/18 0400   BP: 139/64 137/81 143/77 138/80   Pulse: 73 77 65 64   Resp: 16 18 20 20   Temp: 36.8 °C (98.3 °F) 36.7 °C (98 °F) 37 °C (98.6 °F) 36.4 °C (97.6 °F)   SpO2: 97% 95% 93% 93%   Weight:       Height:         Body mass index is 29.33 kg/m².    Oxygen Therapy:  Pulse Oximetry: 93 %, O2 (LPM): 0, O2 Delivery: None (Room Air)    Physical Exam   Constitutional: He is oriented to person, place, and time. He appears distressed.   Pt didn't want to move for examination   Cardiovascular: Normal rate, regular rhythm and normal heart sounds.    Pulmonary/Chest: Effort normal and breath sounds normal.   Abdominal: Bowel sounds are normal. There is tenderness.   Neurological: He is alert and oriented to person, place, and time. Gait normal.   Psychiatric: Affect normal.       Assessment/Plan     Acute on chronic abdominal pain, likely due to pancreatitis  Pancreatic divisum  - [facility]. He has abdomen pain since last 3 years. He has worsening pain since last 2 weeks. [facility] and [facility] to treat possible stricture.   - H/o MRCP in 9/8/2017 which showed pancreatic divisum with the body and tail of the pancreas draining through the minor papilla.  - Lipase 270, TG normal.   - GI on board.   Plan  - IV fluids, NPO from midnight, awaiting MRCP  - Pain management with oxycodone. (IV morphine PRN while NPO)  - Restarted pancrealipase,[facility].  - GI suggested MRCP and ERCP will be decided based on the results.    BPPV   - Has h/o BPPV  - PRN promethazine.   - Epley's was deferred.    Non-anion gap acidosis  - likely due to saline. CTM.    Chronic back pain   - continue home oxycodone for pain control.

## 2018-07-12 NOTE — PROGRESS NOTES
Note from GI noted, teaching reinforced with patient.  Peripheral IV dressing changed, IV flushed, and information about secretin enhanced MRCP given to patient.  He is resting quietly in bed with call light in reach.

## 2018-07-12 NOTE — PROGRESS NOTES
Internal Medicine Interval Note  Note Author: Sheila Engle M.D.     Name Cheng Kline     1960   Age/Sex 57 y.o. male   MRN [id number]   Code Status Full     After 5PM or if no immediate response to page, please call for cross-coverage  Attending/Team: [employer] See Patient List for primary contact information  Call [phone] to page    1st Call - Day Intern (R1):   Dr Jenkins 2nd Call - Day Sr. Resident (R2/R3):   Dr. Engle         Reason for interval visit  (Principal Problem)   Acute on chronic pancreatitis    Interval Problem Daily Status Update  (24 hours, problem oriented, brief subjective history, new lab/imaging data pertinent to that problem)   Overnight: One episode of HR 55.     Subjective: C/o positional vertigo, nausea and vomiting.    Today's events:  Ordered promethazine for BPPV. Deferred Epley's maneuver as patient was resting well.   MRCP likely tomorrow.       Review of Systems   Constitutional: Negative for chills and fever.   HENT: Negative for hearing loss and tinnitus.    Eyes: Negative for blurred vision and double vision.   Respiratory: Negative for cough, hemoptysis and sputum production.    Cardiovascular: Negative for chest pain, palpitations and leg swelling.   Gastrointestinal: Positive for abdominal pain, nausea and vomiting. Negative for heartburn.   Genitourinary: Negative for dysuria and urgency.   Musculoskeletal: Positive for back pain. Negative for myalgias.   Skin: Negative for itching and rash.   Neurological: Positive for dizziness. Negative for tingling and headaches.   Psychiatric/Behavioral: Negative for depression and suicidal ideas.     Disposition/Barriers to discharge:   Inpatient for possible ERCP/ none    Consultants/Specialty  GI: Dr Tanner    Quality Measures  Quality-Core Measures   Reviewed items::  Labs reviewed, Medications reviewed and Radiology images reviewed  Multani catheter::  No Multani  DVT prophylaxis - mechanical:   SCDs      Physical Exam       Vitals:    07/10/18 2000 07/11/18 0400 07/11/18 1200 07/11/18 1600   BP: 143/57 139/40 125/65 139/64   Pulse: (!) 55 63 74 73   Resp: 14 16 17 16   Temp: 36.6 °C (97.8 °F) 36.4 °C (97.5 °F) 36.4 °C (97.6 °F) 36.8 °C (98.3 °F)   SpO2: 96% 95% 98% 97%   Weight:       Height:         Body mass index is 29.33 kg/m².    Oxygen Therapy:  Pulse Oximetry: 97 %, O2 (LPM): 0, O2 Delivery: None (Room Air)    Physical Exam   Constitutional: He is oriented to person, place, and time. He appears distressed.   Limited exam due to dizziness. Patient did not want to move for examination.    HENT:   Head: Normocephalic and atraumatic.   Neck: Normal range of motion. Neck supple.   Cardiovascular: Normal rate, regular rhythm and normal heart sounds.    Pulmonary/Chest: Effort normal and breath sounds normal. No respiratory distress. He has no wheezes.   Abdominal: Soft. Bowel sounds are normal. There is tenderness. There is no rebound and no guarding.   Neurological: He is alert and oriented to person, place, and time. Gait normal.   Psychiatric: Affect normal.       Assessment/Plan     Acute on chronic abdominal pain, likely due to pancreatitis  Pancreatic divisum  - [facility]. He has abdomen pain since last 3 years. He has worsening pain since last 2 weeks. [facility] and [facility] for [facility]CP to treat possible stricture.   - H/o MRCP in 9/8/2017 which showed pancreatic divisum with the body and tail of the pancreas draining through the minor papilla.  - Lipase 270, TG normal.   - GI on board.   Plan  - IV fluids, clear liquid diet.  - Pain management with oxycodone.   - Restarted pancrealipase,it was started [facility].  - GI suggested MRCP and ERCP will be decided based on the results. NPO midnight.    BPPV   - Has h/o BPPV  - PRN promethazine.   - Epley's was deferred as patient was resting well.      Non-anion gap acidosis  - likely due to saline.  CTM.    Chronic back pain   - continue home oxycodone for pain control.

## 2018-07-12 NOTE — PROGRESS NOTES
Environmental services staff asked how patient was doing today, and he began yelling that he was horrible.  Intervention by nursing staff revealed that he was angry that the MD 'called me by the wrong name; she called me Mr. Kline.'  Then he showed a Vasquez Steiner pass around his neck and said, 'My father is in hell!'  Therapeutic communication calmed patient considerably, and he is resting comfortably in bed at the time of this note with call light in reach.

## 2018-07-12 NOTE — PROGRESS NOTES
Patient tolerated Secretin injection with no complaints of nausea, vomiting, or increased abdominal pain.  Continuous monitoring being performed at the time of this note.

## 2018-07-12 NOTE — PROGRESS NOTES
Patient awake, alert and oriented x 4 with some paranoid behaviors, denies pain or nausea at this time.  He was observed ambulating to bathroom, no safety issues identified.  Call light in reach.

## 2018-07-12 NOTE — PROGRESS NOTES
Gastroenterology (GIC) Progress Note    Name Cheng Kline     1960   Age/Sex 57 y.o. male   MRN [id number]           Reason for consult  Pancreatic divisim      Impression   A 56 yo M with past medical history significant for recently diagnosed pancreatic divisim in setting of persistent abdominal symptoms and an attack of pancreatitis last year. He was evaluated initially at [facility] and was transferred to [facility] for possible endoscopic intervention. Unclear etiology for his chronic abdominal pain, suspect chronic pancreatitis, vs dysfunctional abdominal pain vs pancreatic divisim vs cyclic vomiting syndrome.       Recommendations   - Pending secretin enhanced MRCP  - Continue supportive therapy   - Will review GI records from [facility]   - Will review results of MRCP and decide on ERCP   - Will continue to follow      Interval history   - NAEO   - Still complains of nausea and abdominal pain   - Mild bump in liver enzymes     Review of Systems   Constitutional: Negative for chills and fever.   Respiratory: Negative for cough.    Cardiovascular: Negative for chest pain.   Gastrointestinal: Positive for abdominal pain and nausea. Negative for blood in stool and diarrhea.   Musculoskeletal: Positive for back pain and joint pain.       Quality-Core Measures      Physical Exam       Vitals:    18 2000 18 0000 18 0400 18 0752   BP: 137/81 143/77 138/80 152/116   Pulse: 77 65 64 65   Resp: 18 20 20 18   Temp: 36.7 °C (98 °F) 37 °C (98.6 °F) 36.4 °C (97.6 °F) 36.9 °C (98.5 °F)   SpO2: 95% 93% 93% 97%   Weight:       Height:         Body mass index is 29.33 kg/m².    Oxygen Therapy:  Pulse Oximetry: 97 %, O2 (LPM): 0, O2 Delivery: None (Room Air)    Physical Exam   Constitutional: He is oriented to person, place, and time and well-developed, well-nourished, and in no distress. No distress.   HENT:   Head: Normocephalic and atraumatic.   Cardiovascular: Normal rate,  regular rhythm and normal heart sounds.    Pulmonary/Chest: Effort normal and breath sounds normal. No stridor. No respiratory distress.   Abdominal: Soft. Bowel sounds are normal. There is tenderness. There is no guarding.   Mild diffuse TTP   Neurological: He is alert and oriented to person, place, and time. GCS score is 15.   Skin: He is not diaphoretic.         Lab Data Review:      7/12/2018  10:41 AM    Recent Labs      07/10/18   0551  07/11/18   0327   SODIUM  143  140   POTASSIUM  4.3  4.1   CHLORIDE  112  112   CO2  21  19*   BUN  13  10   CREATININE  0.96  0.83   CALCIUM  9.8  9.4       Recent Labs      07/10/18   0551  07/11/18   0327   ALTSGPT  42  68*   ASTSGOT  40  53*   ALKPHOSPHAT  85  102*   TBILIRUBIN  0.9  0.7   LIPASE  270*   --    GLUCOSE  84  106*       Recent Labs      07/10/18   0551   RBC  5.32   HEMOGLOBIN  16.2   HEMATOCRIT  50.3   PLATELETCT  382       Recent Labs      07/10/18   0551  07/11/18 0327   WBC  11.0*   --    ASTSGOT  40  53*   ALTSGPT  42  68*   ALKPHOSPHAT  85  102*   TBILIRUBIN  0.9  0.7

## 2018-07-12 NOTE — PROGRESS NOTES
Accompanied patient to MRI at 1500 for secretin enhanced MRCP.  Patient is in procedure at the time of this note, observed by nurse and tolerating procedure well.

## 2018-07-12 NOTE — CARE PLAN
Problem: Venous Thromboembolism (VTW)/Deep Vein Thrombosis (DVT) Prevention:  Goal: Patient will participate in Venous Thrombosis (VTE)/Deep Vein Thrombosis (DVT)Prevention Measures  Outcome: PROGRESSING AS EXPECTED  Pt refusing SCDs. Pt ambulating often in room and around unit with assistance from clinical staff.     Problem: Pain Management  Goal: Pain level will decrease to patient's comfort goal  Outcome: PROGRESSING AS EXPECTED  Pt medicated with PRN pain medication for abd and back pain.

## 2018-07-13 VITALS
DIASTOLIC BLOOD PRESSURE: 90 MMHG | SYSTOLIC BLOOD PRESSURE: 168 MMHG | OXYGEN SATURATION: 98 % | WEIGHT: 210.32 LBS | BODY MASS INDEX: 29.44 KG/M2 | RESPIRATION RATE: 18 BRPM | HEART RATE: 67 BPM | HEIGHT: 71 IN | TEMPERATURE: 98.2 F

## 2018-07-13 PROBLEM — M54.9 CHRONIC BACK PAIN: Status: ACTIVE | Noted: 2018-07-13

## 2018-07-13 PROBLEM — G89.29 CHRONIC BACK PAIN: Status: ACTIVE | Noted: 2018-07-13

## 2018-07-13 PROCEDURE — A9270 NON-COVERED ITEM OR SERVICE: HCPCS | Performed by: STUDENT IN AN ORGANIZED HEALTH CARE EDUCATION/TRAINING PROGRAM

## 2018-07-13 PROCEDURE — 700102 HCHG RX REV CODE 250 W/ 637 OVERRIDE(OP): Performed by: INTERNAL MEDICINE

## 2018-07-13 PROCEDURE — A9270 NON-COVERED ITEM OR SERVICE: HCPCS | Performed by: INTERNAL MEDICINE

## 2018-07-13 PROCEDURE — 700102 HCHG RX REV CODE 250 W/ 637 OVERRIDE(OP): Performed by: STUDENT IN AN ORGANIZED HEALTH CARE EDUCATION/TRAINING PROGRAM

## 2018-07-13 RX ORDER — OMEPRAZOLE 20 MG/1
20 CAPSULE, DELAYED RELEASE ORAL DAILY
Qty: 30 CAP | Refills: 0 | Status: SHIPPED | OUTPATIENT
Start: 2018-07-14 | End: 2020-06-22

## 2018-07-13 RX ADMIN — PANCRELIPASE 6000 UNITS: 30000; 6000; 19000 CAPSULE, DELAYED RELEASE PELLETS ORAL at 11:39

## 2018-07-13 RX ADMIN — OXYCODONE HYDROCHLORIDE 10 MG: 10 TABLET ORAL at 10:44

## 2018-07-13 RX ADMIN — OXYCODONE HYDROCHLORIDE 10 MG: 10 TABLET ORAL at 05:08

## 2018-07-13 ASSESSMENT — ENCOUNTER SYMPTOMS
HEADACHES: 0
BACK PAIN: 1
DIZZINESS: 0
FEVER: 0
DIARRHEA: 0
CONSTIPATION: 0
NAUSEA: 0
PALPITATIONS: 0
HEARTBURN: 0
ABDOMINAL PAIN: 1
VOMITING: 0
BRUISES/BLEEDS EASILY: 0
CHILLS: 0
BLOOD IN STOOL: 0
COUGH: 0

## 2018-07-13 ASSESSMENT — PAIN SCALES - GENERAL
PAINLEVEL_OUTOF10: 8
PAINLEVEL_OUTOF10: 4

## 2018-07-13 NOTE — PROGRESS NOTES
Patient discharged to home by taxi with self. Patient discharged via wheelchair, hospital escort present. Patient stable at time of discharge. IV removed, catheter tip intact. Discharge instructions were reviewed and all questions answered. All personal belongings returned prior to discharge.

## 2018-07-13 NOTE — CARE PLAN
Problem: Nutritional:  Goal: Achieve adequate nutritional intake  Patient's diet will advance past clears and will consume >50% of meals   Outcome: PROGRESSING AS EXPECTED  Diet advanced to Regular.  PO >50% for the last two meals.

## 2018-07-13 NOTE — PROGRESS NOTES
Gastroenterology (GIC) Progress Note    Name Cheng Kline     1960   Age/Sex 57 y.o. male   MRN [id number]           Reason for consult  Pancreatic divisim      Impression   A 56 yo M with past medical history significant for recently diagnosed pancreatic divisim in setting of persistent abdominal symptoms and an attack of pancreatitis last year. He was evaluated initially at [facility] and was transferred to [facility] [place] for possible endoscopic intervention. Unclear etiology for his chronic abdominal pain and recurrent pancreatitis. MRCP did not reveal evidence of pancreatic duct hypertension, ERCP was deferred due to risk of worsening pancreatitis without obvious benefit. Outpatient follow up for EUS/ERCP to evaluate for microlithiasis, mass or chronic pancreatitis with possible celiac axis block as needed     Recommendations   - Continue supportive therapy   - Advance diet as tolerated  - Outpatient follow up for EUS and further evaluation   - Patient can be discharged from GI stand point   - GI will sign-off, feel free to call for any questions       Interval history   - NAEO   - Tolerated procedure well   - MRCP: no pancreatic duct dilatation   - Improved symptoms     Review of Systems   Constitutional: Negative for chills and fever.   Respiratory: Negative for cough.    Cardiovascular: Negative for chest pain.   Gastrointestinal: Positive for abdominal pain. Negative for blood in stool, diarrhea and vomiting.   Musculoskeletal: Positive for back pain and joint pain.       Quality-Core Measures      Physical Exam       Vitals:    18 1600 18 2100 18 0400 18 0700   BP: 134/102 146/71 153/83 (!) 168/90   Pulse: 67 68 62 67   Resp:    Temp: 36.9 °C (98.4 °F) 36.4 °C (97.6 °F) 36.3 °C (97.4 °F) 36.8 °C (98.2 °F)   SpO2: 93% 94% 96% 98%   Weight:       Height:         Body mass index is 29.33 kg/m².    Oxygen Therapy:  Pulse Oximetry: 98 %, O2 (LPM): 0, O2  Delivery: None (Room Air)    Physical Exam   Constitutional: He is oriented to person, place, and time and well-developed, well-nourished, and in no distress. No distress.   HENT:   Head: Normocephalic and atraumatic.   Cardiovascular: Normal rate, regular rhythm and normal heart sounds.    Pulmonary/Chest: Effort normal and breath sounds normal. No stridor. No respiratory distress.   Abdominal: Soft. Bowel sounds are normal. There is tenderness. There is no guarding.   Mild diffuse TTP   Neurological: He is alert and oriented to person, place, and time. GCS score is 15.   Skin: He is not diaphoretic.       Lab Data Review:      7/12/2018  10:41 AM    Recent Labs      07/11/18   0327   SODIUM  140   POTASSIUM  4.1   CHLORIDE  112   CO2  19*   BUN  10   CREATININE  0.83   CALCIUM  9.4       Recent Labs      07/11/18   0327   ALTSGPT  68*   ASTSGOT  53*   ALKPHOSPHAT  102*   TBILIRUBIN  0.7   GLUCOSE  106*       No results for input(s): RBC, HEMOGLOBIN, HEMATOCRIT, PLATELETCT, PROTHROMBTM, APTT, INR, IRON, FERRITIN, TOTIRONBC in the last 72 hours.    Recent Labs      07/11/18   0327   ASTSGOT  53*   ALTSGPT  68*   ALKPHOSPHAT  102*   TBILIRUBIN  0.7

## 2018-07-13 NOTE — ASSESSMENT & PLAN NOTE
- MRCP (07/12/18) : pancreatic divisum without MRI evidence of acute or chronic pancreatitis.  -F/U GI outpt for possible ERCP/EUA

## 2018-07-13 NOTE — ASSESSMENT & PLAN NOTE
"- Persistent back pain for many years  - He reports being seen by orthopedics and they recommended \"steroid\" injections  - He manages it with Mariajuana and oxycodone at home      "

## 2018-07-13 NOTE — PROGRESS NOTES
Internal Medicine Interval Note  Note Author: Michelle Jenkins M.D.     Name Cheng Kline     1960   Age/Sex 57 y.o. male   MRN [id number]   Code Status Full     After 5PM or if no immediate response to page, please call for cross-coverage  Attending/Team: Dr. Cunningham/ DAVIE James See Patient List for primary contact information  Call [phone] to page    1st Call - Day Intern (R1):   Dr. Jenkins 2nd Call - Day Sr. Resident (R2/R3):   Dr. Engle         Reason for interval visit  (Principal Problem)   Acute on chronic pancreatitis      Interval Problem Daily Status Update  (24 hours, problem oriented, brief subjective history, new lab/imaging data pertinent to that problem)   Abdominal Pain: Pt doing better. Pain better than yesterday. MRCP was done yesterday: pancreatic divisum without MRI evidence of acute or chronic pancreatitis.   Review of Systems   Constitutional: Negative for chills and fever.   Respiratory: Negative for cough.    Cardiovascular: Negative for chest pain and palpitations.   Gastrointestinal: Positive for abdominal pain. Negative for blood in stool, constipation, diarrhea, heartburn, nausea and vomiting.   Genitourinary: Negative for dysuria, frequency and urgency.   Neurological: Negative for dizziness and headaches.   Endo/Heme/Allergies: Negative for environmental allergies. Does not bruise/bleed easily.       Disposition/Barriers to discharge:   None    Consultants/Specialty  GI : Dr. Moura  Pcp Pt States None        Quality Measures  Quality-Core Measures   Reviewed items::  EKG reviewed, Labs reviewed, Medications reviewed and Radiology images reviewed  Multani catheter::  No Multani  DVT prophylaxis - mechanical:  SCDs          Physical Exam       Vitals:    18 1600 18 2100 18 0400 18 0700   BP: 134/102 146/71 153/83 (!) 168/90   Pulse: 67 68 62 67   Resp:    Temp: 36.9 °C (98.4 °F) 36.4 °C (97.6 °F) 36.3 °C (97.4 °F) 36.8 °C (98.2  "°F)   SpO2: 93% 94% 96% 98%   Weight:       Height:         Body mass index is 29.33 kg/m².    Oxygen Therapy:  Pulse Oximetry: 98 %, O2 (LPM): 0, O2 Delivery: None (Room Air)    Physical Exam   Constitutional: He is oriented to person, place, and time and well-developed, well-nourished, and in no distress.   HENT:   Head: Normocephalic and atraumatic.   Cardiovascular: Normal rate and regular rhythm.    Pulmonary/Chest: No respiratory distress.   Abdominal: Soft. Bowel sounds are normal. He exhibits no mass. There is tenderness. There is no rebound and no guarding.   Musculoskeletal: Normal range of motion.   Neurological: He is alert and oriented to person, place, and time.   Skin: Skin is warm.   Psychiatric: Affect normal.             Assessment/Plan     Acute on chronic pancreatitis (HCC)  -RUQ pain. Lipase 270.   -Oxycodone 10 mg for pain management  -Pancrelipase P/O stared yesterday  -Will D/C on medical management and then outpatient GI F/U for further eval    Pancreatic divisum  - MRCP (07/12/18) : pancreatic divisum without MRI evidence of acute or chronic pancreatitis.  -F/U GI outpt for possible ERCP/EUA     Chronic back pain  - Persistent back pain for many years  - He reports being seen by orthopedics and they recommended \"steroid\" injections  - He manages it with Mariajuana and oxycodone at home        "

## 2018-07-13 NOTE — PROGRESS NOTES
Assumed care of pt at 1900. AAOx4, RA. Medicated per MAR for abd and back pain. Pt NPO for ERCP. Hourly rounding in place.

## 2018-07-13 NOTE — ASSESSMENT & PLAN NOTE
-RUQ pain. Lipase 270.   -Oxycodone 10 mg for pain management  -Pancrelipase P/O stared yesterday  -Will D/C on medical management and then outpatient GI F/U for further eval

## 2018-07-13 NOTE — DISCHARGE PLANNING
Anticipated Discharge Disposition: Home    Action: LSW provided taxi voucher([id number]) for pt to [facility].      Barriers to Discharge: None    Plan: Pt to dc home

## 2018-07-13 NOTE — CARE PLAN
Problem: Safety  Goal: Will remain free from injury  Outcome: PROGRESSING AS EXPECTED  Bed locked and in lowest position. Call light and personal belongings in reach. Hourly rounding.     Problem: Pain Management  Goal: Pain level will decrease to patient's comfort goal  Outcome: PROGRESSING AS EXPECTED  Pt c/o 8/10 abd and back pain. Medicated per MAR. Offered heat/ice packs, pt declined.

## 2018-07-13 NOTE — DISCHARGE SUMMARY
Internal Medicine Discharge Summary  Note Author: Sheila Engle M.D.       Name Cheng Kline     1960   Age/Sex 57 y.o. male   MRN [id number]         Admit Date:  2018       Discharge Date:  18      Service:   [facility] Internal Medicine Gray Team  Attending Physician(s):   Dr Cunningham       Senior Resident(s):   Dr Engle  [place] Resident(s):   Dr Jenkins  PCP: Pcp Pt States None    Primary Diagnosis:   Acute on chronic pancreatitis    Secondary Diagnoses:      Pancreatic divisum  Chronic low back pain            Hospital Summary (Brief Narrative):       Mr Iverson with history of recurrent pancreatitis and pacnreatic divisum [facility].   GI ([facility]) was consulted for ERCP. MRCP was done, showed minimal dilation of CBD, pancreatic divisum with no MRI evidence of acute or chronic pancreatitis and no significant change in caliber of pancreatic duct or biliary tree on the post-secretin imaging. GI suggested outpatient follow up for EUS and further evaluation. He has tolerated diet well since the transfer. Discharging on pancrealipase and omeprazole. He is clinically stable for discharge.    Consultants:     GI consultants: Dr Tanner, Dr Moura    Procedures:        None    Imaging/ Testing:      [id number]   Final Result      1.  The gallbladder is not identified and may be surgically absent or contracted.   2.  There is minimal dilatation of the distal common bile duct without intrahepatic biliary dilatation.   3.  Questionable flow-related artifact versus linear debris or less likely nonobstructive 2-3 mm calcification in the distal common duct.   4.  There is pancreatic divisum without MRI evidence of acute or chronic pancreatitis.   5.  There is no significant change in the caliber of the pancreatic duct or biliary tree on the postsecretin imaging.          Discharge Medications:         Medication Reconciliation: Completed       Medication List       START taking these medications      Instructions   omeprazole 20 MG delayed-release capsule  Start taking on:  7/14/2018  Commonly known as:  PRILOSEC   Take 1 Cap by mouth every day.  Dose:  20 mg     pancrelipase (Lip-Prot-Amyl) 6000 units Cpep  Commonly known as:  CREON 6000   Take 1 Cap by mouth 3 times a day, with meals.  Dose:  1 Cap        CONTINUE taking these medications      Instructions   methocarbamol 500 MG Tabs  Commonly known as:  ROBAXIN   Take 1,000 mg by mouth 4 times a day.  Dose:  1000 mg     oxyCODONE immediate-release 5 MG Tabs  Commonly known as:  ROXICODONE   Take 5 mg by mouth every four hours as needed for Severe Pain.  Dose:  5 mg            Disposition:   Home    Diet:   regular    Activity:   As tolerated    Instructions:      The patient was instructed to return to the ER in the event of worsening symptoms. I have counseled the patient on the importance of compliance and the patient has agreed to proceed with all medical recommendations and follow up plan indicated above.   The patient understands that all medications come with benefits and risks. Risks may include permanent injury or death and these risks can be minimized with close reassessment and monitoring.        Primary Care Provider:   Pcp Pt States None  Discharge summary faxed to primary care provider:  Deferred  Copy of discharge summary given to the patient: Deferred      Follow up appointment details :      With [facility] for possible EUS and further workup. He will receive a call regarding appointment.    Pending Studies:  None    Time spent on discharge day patient visit, preparing discharge paperwork and arranging for patient follow up.    Discharge Time (Minutes) :  40 min  Hospital Course Type:  Inpatient Stay >2 midnights

## 2018-07-13 NOTE — DISCHARGE INSTRUCTIONS
Okay to discharge with prescriptions. He will receive a call from GI for follow up appointment. Please PROVIDE [facility] phone number to the patient. HE should call them if he does not receive a call in 2 weeks. Thanks.    Discharge Instructions    Discharged to home by taxi with self. Discharged via wheelchair, hospital escort: Yes.  Special equipment needed: Not Applicable    Be sure to schedule a follow-up appointment with your primary care doctor or any specialists as instructed.     Discharge Plan:   Smoking Cessation Offered: Patient Refused  Pneumococcal Vaccine Administered/Refused: Not given - Patient refused pneumococcal vaccine  Influenza Vaccine Indication: Patient Refuses    I understand that a diet low in cholesterol, fat, and sodium is recommended for good health. Unless I have been given specific instructions below for another diet, I accept this instruction as my diet prescription.   Other diet: Regular    Special Instructions: None    · Is patient discharged on Warfarin / Coumadin?   No     Depression / Suicide Risk    As you are discharged from this [facility] facility, it is important to learn how to keep safe from harming yourself.    Recognize the warning signs:  · Abrupt changes in personality, positive or negative- including increase in energy   · Giving away possessions  · Change in eating patterns- significant weight changes-  positive or negative  · Change in sleeping patterns- unable to sleep or sleeping all the time   · Unwillingness or inability to communicate  · Depression  · Unusual sadness, discouragement and loneliness  · Talk of wanting to die  · Neglect of personal appearance   · Rebelliousness- reckless behavior  · Withdrawal from people/activities they love  · Confusion- inability to concentrate     If you or a loved one observes any of these behaviors or has concerns about self-harm, here's what you can do:  · Talk about it- your feelings and reasons for harming  yourself  · Remove any means that you might use to hurt yourself (examples: pills, rope, extension cords, firearm)  · Get professional help from the community (Mental Health, Substance Abuse, psychological counseling)  · Do not be alone:Call your Safe Contact- someone whom you trust who will be there for you.  · Call your local CRISIS HOTLINE 269-6616 or 329-412-8030  · Call your local Children's Mobile Crisis Response Team [place] (395) 207-2689 or www.[employer]  · Call the toll free National Suicide Prevention Hotlines   · National Suicide Prevention Lifeline 280-930-TPJP (4955)  · National Hope Line Network 800-SUICIDE (460-3440)

## 2019-01-10 ENCOUNTER — HOSPITAL ENCOUNTER (INPATIENT)
Facility: MEDICAL CENTER | Age: 59
LOS: 1 days | DRG: 439 | End: 2019-01-11
Attending: INTERNAL MEDICINE | Admitting: INTERNAL MEDICINE
Payer: COMMERCIAL

## 2019-01-10 ENCOUNTER — HOSPITAL ENCOUNTER (OUTPATIENT)
Facility: MEDICAL CENTER | Age: 59
DRG: 439 | End: 2019-01-10
Admitting: INTERNAL MEDICINE
Payer: COMMERCIAL

## 2019-01-10 PROBLEM — Z72.0 TOBACCO ABUSE: Status: ACTIVE | Noted: 2019-01-10

## 2019-01-10 PROCEDURE — A9270 NON-COVERED ITEM OR SERVICE: HCPCS | Performed by: HOSPITALIST

## 2019-01-10 PROCEDURE — 770006 HCHG ROOM/CARE - MED/SURG/GYN SEMI*

## 2019-01-10 PROCEDURE — 700105 HCHG RX REV CODE 258: Performed by: HOSPITALIST

## 2019-01-10 PROCEDURE — 99223 1ST HOSP IP/OBS HIGH 75: CPT | Mod: 25 | Performed by: HOSPITALIST

## 2019-01-10 PROCEDURE — 99407 BEHAV CHNG SMOKING > 10 MIN: CPT | Performed by: HOSPITALIST

## 2019-01-10 PROCEDURE — 700102 HCHG RX REV CODE 250 W/ 637 OVERRIDE(OP): Performed by: HOSPITALIST

## 2019-01-10 RX ORDER — PROMETHAZINE HYDROCHLORIDE 25 MG/1
12.5-25 TABLET ORAL EVERY 4 HOURS PRN
Status: DISCONTINUED | OUTPATIENT
Start: 2019-01-10 | End: 2019-01-11 | Stop reason: HOSPADM

## 2019-01-10 RX ORDER — ONDANSETRON 2 MG/ML
4 INJECTION INTRAMUSCULAR; INTRAVENOUS EVERY 4 HOURS PRN
Status: DISCONTINUED | OUTPATIENT
Start: 2019-01-10 | End: 2019-01-11 | Stop reason: HOSPADM

## 2019-01-10 RX ORDER — SODIUM CHLORIDE 9 MG/ML
INJECTION, SOLUTION INTRAVENOUS CONTINUOUS
Status: DISCONTINUED | OUTPATIENT
Start: 2019-01-10 | End: 2019-01-11

## 2019-01-10 RX ORDER — BISACODYL 10 MG
10 SUPPOSITORY, RECTAL RECTAL
Status: DISCONTINUED | OUTPATIENT
Start: 2019-01-10 | End: 2019-01-11 | Stop reason: HOSPADM

## 2019-01-10 RX ORDER — MORPHINE SULFATE 4 MG/ML
4 INJECTION, SOLUTION INTRAMUSCULAR; INTRAVENOUS
Status: DISCONTINUED | OUTPATIENT
Start: 2019-01-10 | End: 2019-01-11 | Stop reason: HOSPADM

## 2019-01-10 RX ORDER — ACETAMINOPHEN 325 MG/1
650 TABLET ORAL EVERY 6 HOURS PRN
Status: DISCONTINUED | OUTPATIENT
Start: 2019-01-10 | End: 2019-01-11 | Stop reason: HOSPADM

## 2019-01-10 RX ORDER — AMOXICILLIN 250 MG
2 CAPSULE ORAL 2 TIMES DAILY
Status: DISCONTINUED | OUTPATIENT
Start: 2019-01-10 | End: 2019-01-11 | Stop reason: HOSPADM

## 2019-01-10 RX ORDER — OXYCODONE HYDROCHLORIDE 5 MG/1
5 TABLET ORAL
Status: DISCONTINUED | OUTPATIENT
Start: 2019-01-10 | End: 2019-01-11 | Stop reason: HOSPADM

## 2019-01-10 RX ORDER — ENALAPRILAT 1.25 MG/ML
1.25 INJECTION INTRAVENOUS EVERY 6 HOURS PRN
Status: DISCONTINUED | OUTPATIENT
Start: 2019-01-10 | End: 2019-01-11 | Stop reason: HOSPADM

## 2019-01-10 RX ORDER — PROMETHAZINE HYDROCHLORIDE 25 MG/1
12.5-25 SUPPOSITORY RECTAL EVERY 4 HOURS PRN
Status: DISCONTINUED | OUTPATIENT
Start: 2019-01-10 | End: 2019-01-11 | Stop reason: HOSPADM

## 2019-01-10 RX ORDER — ONDANSETRON 4 MG/1
4 TABLET, ORALLY DISINTEGRATING ORAL EVERY 4 HOURS PRN
Status: DISCONTINUED | OUTPATIENT
Start: 2019-01-10 | End: 2019-01-11 | Stop reason: HOSPADM

## 2019-01-10 RX ORDER — POLYETHYLENE GLYCOL 3350 17 G/17G
1 POWDER, FOR SOLUTION ORAL
Status: DISCONTINUED | OUTPATIENT
Start: 2019-01-10 | End: 2019-01-11 | Stop reason: HOSPADM

## 2019-01-10 RX ORDER — OXYCODONE HYDROCHLORIDE 10 MG/1
10 TABLET ORAL
Status: DISCONTINUED | OUTPATIENT
Start: 2019-01-10 | End: 2019-01-11 | Stop reason: HOSPADM

## 2019-01-10 RX ORDER — NICOTINE 21 MG/24HR
14 PATCH, TRANSDERMAL 24 HOURS TRANSDERMAL
Status: DISCONTINUED | OUTPATIENT
Start: 2019-01-10 | End: 2019-01-11 | Stop reason: HOSPADM

## 2019-01-10 RX ADMIN — SODIUM CHLORIDE: 9 INJECTION, SOLUTION INTRAVENOUS at 13:01

## 2019-01-10 RX ADMIN — NICOTINE 14 MG: 14 PATCH, EXTENDED RELEASE TRANSDERMAL at 13:00

## 2019-01-10 RX ADMIN — OXYCODONE HYDROCHLORIDE 5 MG: 5 TABLET ORAL at 13:01

## 2019-01-10 RX ADMIN — OXYCODONE HYDROCHLORIDE 10 MG: 10 TABLET ORAL at 22:05

## 2019-01-10 RX ADMIN — OXYCODONE HYDROCHLORIDE 10 MG: 10 TABLET ORAL at 18:24

## 2019-01-10 ASSESSMENT — PATIENT HEALTH QUESTIONNAIRE - PHQ9
7. TROUBLE CONCENTRATING ON THINGS, SUCH AS READING THE NEWSPAPER OR WATCHING TELEVISION: NOT AT ALL
4. FEELING TIRED OR HAVING LITTLE ENERGY: NOT AT ALL
8. MOVING OR SPEAKING SO SLOWLY THAT OTHER PEOPLE COULD HAVE NOTICED. OR THE OPPOSITE, BEING SO FIGETY OR RESTLESS THAT YOU HAVE BEEN MOVING AROUND A LOT MORE THAN USUAL: NOT AT ALL
SUM OF ALL RESPONSES TO PHQ QUESTIONS 1-9: 2
5. POOR APPETITE OR OVEREATING: NOT AT ALL
9. THOUGHTS THAT YOU WOULD BE BETTER OFF DEAD, OR OF HURTING YOURSELF: NOT AT ALL
3. TROUBLE FALLING OR STAYING ASLEEP OR SLEEPING TOO MUCH: NOT AT ALL
2. FEELING DOWN, DEPRESSED, IRRITABLE, OR HOPELESS: SEVERAL DAYS
1. LITTLE INTEREST OR PLEASURE IN DOING THINGS: SEVERAL DAYS
6. FEELING BAD ABOUT YOURSELF - OR THAT YOU ARE A FAILURE OR HAVE LET YOURSELF OR YOUR FAMILY DOWN: NOT AL ALL
SUM OF ALL RESPONSES TO PHQ9 QUESTIONS 1 AND 2: 2

## 2019-01-10 ASSESSMENT — COGNITIVE AND FUNCTIONAL STATUS - GENERAL
SUGGESTED CMS G CODE MODIFIER DAILY ACTIVITY: CH
DAILY ACTIVITIY SCORE: 24
DAILY ACTIVITIY SCORE: 24
MOBILITY SCORE: 24
SUGGESTED CMS G CODE MODIFIER MOBILITY: CH
SUGGESTED CMS G CODE MODIFIER DAILY ACTIVITY: CH

## 2019-01-10 ASSESSMENT — PAIN SCALES - GENERAL
PAINLEVEL_OUTOF10: 9
PAINLEVEL_OUTOF10: 8
PAINLEVEL_OUTOF10: 6
PAINLEVEL_OUTOF10: 6
PAINLEVEL_OUTOF10: 9
PAINLEVEL_OUTOF10: 8
PAINLEVEL_OUTOF10: 5

## 2019-01-10 ASSESSMENT — ENCOUNTER SYMPTOMS
HEARTBURN: 0
DOUBLE VISION: 0
NERVOUS/ANXIOUS: 1
SORE THROAT: 0
VOMITING: 0
COUGH: 0
FALLS: 0
BACK PAIN: 1
ABDOMINAL PAIN: 1
FEVER: 0
BLURRED VISION: 0
HALLUCINATIONS: 0
DIZZINESS: 0
HEADACHES: 0
LOSS OF CONSCIOUSNESS: 0
NAUSEA: 0
PALPITATIONS: 0
SPUTUM PRODUCTION: 0
SHORTNESS OF BREATH: 1
CHILLS: 0

## 2019-01-10 ASSESSMENT — LIFESTYLE VARIABLES: EVER_SMOKED: YES

## 2019-01-10 NOTE — PROGRESS NOTES
Direct Admit from Dr. Arenas at [facility]. Dr. Chew accepting for Pancreatitis. ADT signed and held and will need to be released upon patient arrival. Patient arriving via ground transport.

## 2019-01-10 NOTE — H&P
Hospital Medicine History & Physical Note    Date of Service  1/10/2019    Primary Care Physician  Pcp Pt States None    Consultants  GI consultanats    Code Status  Full code    Chief Complaint  Abdominal pain     History of Presenting Illness  58 y.o. male with PMHx of pancreatic divisum, recurrent pancreatitis, medication non compliance who [facility]     I have [facility] and have summarized it as following:   Patient with recurrent admission for similar symptoms dating back to Sept 2017. He has non pancreatic divisum and is thought to be the cause of patient's recurrent symptoms. He has a history of cholecystectomy. [facility] but continues to be non compliant with medication and has not yet followed up with an EGD or colonoscopy. He has been recommended to take omeprazole, pancrelipase, gabapentin, and avoid NSAIDs. He refused to take these medications and continued to use ibuprofen for pain. He was transferred to [facility] in July and at that time had MRCP which showed showed minimal dilation of CBD, pancreatic divisum with no MRI evidence of acute or chronic pancreatitis and no significant change in caliber of pancreatic duct or biliary tree on the post-secretin imaging. Outpatient EUS with conversion to ERCP if CBD stone was present was arranged but issues with [facility] it was unable to be done. He went to [place] for this procedure in October but was cancelled last minute when he was there.     Pertinent [facility] the [facility]: WBC 11, Hgb 17.6, Plt 415, Lipase 236, ALP 95, AST 15, ALT 15, T bili .6     Today patient's abdominal pain is 8/10 generalized throughout his abdomen and radiating to his back and groin. Occasional nausea. I discussed with him that I would be consult GI consults.       Review of Systems  Review of Systems   Constitutional: Negative for chills and fever.   HENT: Negative for  "congestion, hearing loss and sore throat.    Eyes: Negative for blurred vision and double vision.   Respiratory: Positive for shortness of breath. Negative for cough and sputum production.    Cardiovascular: Negative for chest pain and palpitations.   Gastrointestinal: Positive for abdominal pain. Negative for heartburn, nausea and vomiting.        Mcdonough during BM   Genitourinary: Negative for dysuria and urgency.        Stings when he urinates   Musculoskeletal: Positive for back pain. Negative for falls.   Skin: Negative for itching and rash.   Neurological: Negative for dizziness, loss of consciousness and headaches.   Psychiatric/Behavioral: Negative for hallucinations. The patient is nervous/anxious.    All other systems reviewed and are negative.      Past Medical History   has a past medical history of Cancer ([facility]); COPD (chronic obstructive pulmonary disease) ([facility]); Myocardial infarct ([facility]) (09/11/2005); Pancreatic divisum; Pancreatitis; and Psychiatric problem.    Surgical History   has a past surgical history that includes other orthopedic surgery and cholecystectomy.     Family History  family history includes Cancer in his mother; No Known Problems in his father.     Social History   reports that he has been smoking Cigarettes.  He has a 22.00 pack-year smoking history. He has never used smokeless tobacco. He reports that he uses drugs. He reports that he does not drink alcohol.    Allergies  Allergies   Allergen Reactions   • Codeine Hives   • Tylenol Unspecified     \"Tylenol makes me violent.\"   • Darvocet [Propoxyphene N-Apap] Hives   • Elavil [Amitriptyline] Anaphylaxis   • Hydrocodone Anaphylaxis       Medications  Prior to Admission Medications   Prescriptions Last Dose Informant Patient Reported? Taking?   methocarbamol (ROBAXIN) 500 MG Tab 12/25/2018  Yes No   Sig: Take 1,000 mg by mouth 4 times a day.   omeprazole (PRILOSEC) 20 MG delayed-release capsule 12/31/2016  No No   Sig: Take 1 Cap by " mouth every day.   oxyCODONE immediate-release (ROXICODONE) 5 MG Tab 7/1/2018  Yes No   Sig: Take 5 mg by mouth every four hours as needed for Severe Pain.   pancrelipase, Lip-Prot-Amyl, (CREON 6000) 6000 units Cap DR Particles 7/5/2018  No No   Sig: Take 1 Cap by mouth 3 times a day, with meals.      Facility-Administered Medications: None       Physical Exam  Temp:  [36.6 °C (97.9 °F)] 36.6 °C (97.9 °F)  Pulse:  [70] 70  Resp:  [19] 19  BP: (134)/(82) 134/82    Physical Exam   Constitutional: He is oriented to person, place, and time. He appears well-developed and well-nourished.   HENT:   Head: Normocephalic and atraumatic.   Mouth/Throat: Oropharynx is clear and moist. No oropharyngeal exudate.   Upper and lower dentures   Eyes: Pupils are equal, round, and reactive to light. EOM are normal.   Neck: Normal range of motion. Neck supple.   Cardiovascular: Normal rate, regular rhythm and normal heart sounds.    No murmur heard.  Pulmonary/Chest: Effort normal and breath sounds normal. No respiratory distress. He has no wheezes.   Abdominal: Soft. Bowel sounds are normal. He exhibits no distension. There is tenderness (throughout. (pt wincing)). There is no rebound and no guarding.   Musculoskeletal: Normal range of motion. He exhibits no edema.   Neurological: He is alert and oriented to person, place, and time.   Skin: Skin is warm and dry.   Psychiatric:   Strange affect.    Nursing note and vitals reviewed.      Laboratory:          No results for input(s): ALTSGPT, ASTSGOT, ALKPHOSPHAT, TBILIRUBIN, DBILIRUBIN, GAMMAGT, AMYLASE, LIPASE, ALB, PREALBUMIN, GLUCOSE in the last 72 hours.              No results for input(s): TROPONINI in the last 72 hours.    Urinalysis:    No results found     Imaging:  No orders to display         Assessment/Plan:  I anticipate this patient will require at least two midnights for appropriate medical management, necessitating inpatient admission.    * Acute on chronic pancreatitis  (HCC)- (present on admission)   Assessment & Plan    Recurrent, possibly d/t pancreatic divisum?  Had MRCP in July which didn't show any biliary or pancretic ductal dilatation. Triglyceride and IGG4 wnl  Liver enzymes and t bili wnl  NPO, IVF fluids and pain management  I discussed case with GI, Dr. Ortiz who recommends management of acute flare and outpatient follow up as he may need pancreatic divisum repair ultimately  As patient is ambulating without difficult there is not felt to be any need for celiac block for pain control      Tobacco abuse   Assessment & Plan    Spent greater than 10 mins on Tobacco cessation education . Discussed options of nicotine patch, medical treatment with wellbutrin and chantix. Discussed the benefits of quitting smoking and risks of continued smoking including cardiovascular disease, cancer and COPD.   Also discussed that smoking would likely worsen his flares of pancreatitis. Patient is not open to quitting at this time.   Nicotine patches ordered       Pancreatic divisum- (present on admission)   Assessment & Plan    History of         VTE prophylaxis: SCDs, patient ambulatory     20 mins of non face to face time spent [facility] and summarizing them as noted above.

## 2019-01-10 NOTE — ASSESSMENT & PLAN NOTE
Recurrent, possibly d/t pancreatic divisum?  Had MRCP in July which didn't show any biliary or pancretic ductal dilatation. Triglyceride and IGG4 wnl  Liver enzymes and t bili wnl  NPO, IVF fluids and pain management  I discussed case with GI, Dr. Ortiz who recommends management of acute flare and outpatient follow up as he may need pancreatic divisum repair ultimately  As patient is ambulating without difficult there is not felt to be any need for celiac block for pain control

## 2019-01-10 NOTE — CARE PLAN
Problem: Communication  Goal: The ability to communicate needs accurately and effectively will improve  Outcome: PROGRESSING AS EXPECTED  Pt oriented to call light system and when to call for assistance. Pt oriented to room.    Problem: Infection  Goal: Will remain free from infection  Outcome: PROGRESSING AS EXPECTED  Pt taught proper infection prevention while in the hospital including proper oral care and hand hygiene

## 2019-01-10 NOTE — DIETARY
"Nutrition services: Day 0 of admit.  Cheng Kline is a 58 y.o. male with admitting DX of Pancreatitis  History of pancreatic divisum, recurrent pancreatitis, cancer, MI  Poor PO intake noted on admit screen.  Patient sleeping with blanket over head.  Will attempt to talk with him tomorrow.  Limited interventions available on clear liquid diet.    Assessment:  Height: 180.3 cm (5' 11\")  Weight: 88.9 kg (195 lb 15.8 oz)  Body mass index is 27.33 kg/m². - overweight  Diet/Intake: Clear liquid diet just started today - no PO records yet available    Evaluation:   1. 7% weight loss from July 2018 admit per chart review    Malnutrition Risk: At risk for chronic disease related malnutrition    Recommendations/Plan:  1. Advance diet as tolerated.  2. Encourage intake of meals  3. Document intake of all PO as % taken in ADL's to provide interdisciplinary communication across all shifts.   4. Monitor weight.  5. Nutrition rep will continue to see patient for ongoing meal and snack preferences as diet advances.    RD following.  "

## 2019-01-10 NOTE — ASSESSMENT & PLAN NOTE
Spent greater than 10 mins on Tobacco cessation education . Discussed options of nicotine patch, medical treatment with wellbutrin and chantix. Discussed the benefits of quitting smoking and risks of continued smoking including cardiovascular disease, cancer and COPD.   Also discussed that smoking would likely worsen his flares of pancreatitis. Patient is not open to quitting at this time.   Nicotine patches ordered

## 2019-01-10 NOTE — PROGRESS NOTES
Report received. Assumed care at 0700, assessment complete. Pt is A&O x 4. 6/10 pain at this time. Pt pacing around room. Pt with high energy level. Patient instructed on the plan of care for the day. Bed in lowest position. Call light within reach. Patient provided RN and CNA extension.

## 2019-01-11 ENCOUNTER — PATIENT OUTREACH (OUTPATIENT)
Dept: HEALTH INFORMATION MANAGEMENT | Facility: OTHER | Age: 59
End: 2019-01-11

## 2019-01-11 VITALS
BODY MASS INDEX: 27.44 KG/M2 | RESPIRATION RATE: 18 BRPM | HEART RATE: 64 BPM | HEIGHT: 71 IN | TEMPERATURE: 97.3 F | SYSTOLIC BLOOD PRESSURE: 134 MMHG | OXYGEN SATURATION: 95 % | DIASTOLIC BLOOD PRESSURE: 90 MMHG | WEIGHT: 195.99 LBS

## 2019-01-11 PROBLEM — K86.1 ACUTE ON CHRONIC PANCREATITIS (HCC): Status: RESOLVED | Noted: 2018-07-10 | Resolved: 2019-01-11

## 2019-01-11 PROBLEM — K85.90 ACUTE ON CHRONIC PANCREATITIS (HCC): Status: RESOLVED | Noted: 2018-07-10 | Resolved: 2019-01-11

## 2019-01-11 LAB
ALBUMIN SERPL BCP-MCNC: 3.8 G/DL (ref 3.2–4.9)
ALBUMIN/GLOB SERPL: 1.7 G/DL
ALP SERPL-CCNC: 79 U/L (ref 30–99)
ALT SERPL-CCNC: 16 U/L (ref 2–50)
ANION GAP SERPL CALC-SCNC: 8 MMOL/L (ref 0–11.9)
AST SERPL-CCNC: 23 U/L (ref 12–45)
BILIRUB SERPL-MCNC: 0.8 MG/DL (ref 0.1–1.5)
BUN SERPL-MCNC: 8 MG/DL (ref 8–22)
CALCIUM SERPL-MCNC: 8.8 MG/DL (ref 8.5–10.5)
CHLORIDE SERPL-SCNC: 114 MMOL/L (ref 96–112)
CO2 SERPL-SCNC: 18 MMOL/L (ref 20–33)
CREAT SERPL-MCNC: 1.07 MG/DL (ref 0.5–1.4)
ERYTHROCYTE [DISTWIDTH] IN BLOOD BY AUTOMATED COUNT: 45.5 FL (ref 35.9–50)
GLOBULIN SER CALC-MCNC: 2.3 G/DL (ref 1.9–3.5)
GLUCOSE SERPL-MCNC: 73 MG/DL (ref 65–99)
HCT VFR BLD AUTO: 44.7 % (ref 42–52)
HGB BLD-MCNC: 15.2 G/DL (ref 14–18)
MCH RBC QN AUTO: 31.7 PG (ref 27–33)
MCHC RBC AUTO-ENTMCNC: 34 G/DL (ref 33.7–35.3)
MCV RBC AUTO: 93.3 FL (ref 81.4–97.8)
PLATELET # BLD AUTO: 338 K/UL (ref 164–446)
PMV BLD AUTO: 9.8 FL (ref 9–12.9)
POTASSIUM SERPL-SCNC: 4.3 MMOL/L (ref 3.6–5.5)
PROT SERPL-MCNC: 6.1 G/DL (ref 6–8.2)
RBC # BLD AUTO: 4.79 M/UL (ref 4.7–6.1)
SODIUM SERPL-SCNC: 140 MMOL/L (ref 135–145)
WBC # BLD AUTO: 9.5 K/UL (ref 4.8–10.8)

## 2019-01-11 PROCEDURE — 80053 COMPREHEN METABOLIC PANEL: CPT

## 2019-01-11 PROCEDURE — A9270 NON-COVERED ITEM OR SERVICE: HCPCS | Performed by: HOSPITALIST

## 2019-01-11 PROCEDURE — 85027 COMPLETE CBC AUTOMATED: CPT

## 2019-01-11 PROCEDURE — 99239 HOSP IP/OBS DSCHRG MGMT >30: CPT | Performed by: HOSPITALIST

## 2019-01-11 PROCEDURE — 700102 HCHG RX REV CODE 250 W/ 637 OVERRIDE(OP): Performed by: HOSPITALIST

## 2019-01-11 PROCEDURE — 36415 COLL VENOUS BLD VENIPUNCTURE: CPT

## 2019-01-11 RX ADMIN — OXYCODONE HYDROCHLORIDE 10 MG: 10 TABLET ORAL at 11:45

## 2019-01-11 RX ADMIN — NICOTINE 14 MG: 14 PATCH, EXTENDED RELEASE TRANSDERMAL at 04:16

## 2019-01-11 RX ADMIN — OXYCODONE HYDROCHLORIDE 10 MG: 10 TABLET ORAL at 16:07

## 2019-01-11 RX ADMIN — OXYCODONE HYDROCHLORIDE 10 MG: 10 TABLET ORAL at 04:16

## 2019-01-11 ASSESSMENT — PAIN SCALES - GENERAL
PAINLEVEL_OUTOF10: 3
PAINLEVEL_OUTOF10: 8
PAINLEVEL_OUTOF10: 8
PAINLEVEL_OUTOF10: 7
PAINLEVEL_OUTOF10: 5

## 2019-01-11 NOTE — CARE PLAN
Problem: Infection  Goal: Will remain free from infection  Outcome: PROGRESSING AS EXPECTED  VSS. Monitoring labs. No s&s of infection noted.    Problem: Pain Management  Goal: Pain level will decrease to patient's comfort goal  Outcome: PROGRESSING AS EXPECTED  PRN pain medications around the clock for c/o back and abdominal pain 9/10. Patient stating that he is feeling less pain and is able to tolerate diet and mobility more.

## 2019-01-11 NOTE — PROGRESS NOTES
· 2 RN skin check complete.   · Devices in place Na.  · Skin assessed under devices NA.  · Confirmed pressure ulcers found on NA.  · New potential pressure ulcers noted on NA.   · The following interventions are in place Pt up self and turning self.    No wounds found. All bony prominences assessed and intact

## 2019-01-11 NOTE — DISCHARGE INSTRUCTIONS
Discharge Instructions    Discharged to home by taxi with self. Discharged via walking, hospital escort: Refused.  Special equipment needed: Not Applicable    Be sure to schedule a follow-up appointment with your primary care doctor or any specialists as instructed.     Discharge Plan:   Diet Plan: Discussed  Activity Level: Discussed  Smoking Cessation Offered: Patient Refused  Confirmed Follow up Appointment: Patient to Call and Schedule Appointment  Confirmed Symptoms Management: Discussed  Medication Reconciliation Updated: Yes  Pneumococcal Vaccine Administered/Refused: Not given - Patient refused pneumococcal vaccine  Influenza Vaccine Indication: Patient Refuses    I understand that a diet low in cholesterol, fat, and sodium is recommended for good health. Unless I have been given specific instructions below for another diet, I accept this instruction as my diet prescription.   Other diet: Regular, as tolerated    Special Instructions: None    · Is patient discharged on Warfarin / Coumadin?   No       Acute Pancreatitis  Introduction  Acute pancreatitis is a condition in which the pancreas suddenly gets irritated and swollen (has inflammation). The pancreas is a large gland behind the stomach. It makes enzymes that help to digest food. The pancreas also makes hormones that help to control your blood sugar. Acute pancreatitis happens when the enzymes attack the pancreas and damage it. Most attacks last a couple of days and can cause serious problems.  Follow these instructions at home:  Eating and drinking  · Follow instructions from your doctor about diet. You may need to:  ¨ Avoid alcohol.  ¨ Limit how much fat is in your diet.  · Eat small meals often. Avoid eating big meals.  · Drink enough fluid to keep your pee (urine) clear or pale yellow.  · Do not drink alcohol if it caused your condition.  General instructions  · Take over-the-counter and prescription medicines only as told by your doctor.  · Do not  use any tobacco products. These include cigarettes, chewing tobacco, and e-cigarettes. If you need help quitting, ask your doctor.  · Get plenty of rest.  · If directed, check your blood sugar at home as told by your doctor.  · Keep all follow-up visits as told by your doctor. This is important.  Contact a doctor if:  · You do not get better as quickly as expected.  · You have new symptoms.  · Your symptoms get worse.  · You have lasting pain or weakness.  · You continue to feel sick to your stomach (nauseous).  · You get better and then you have another pain attack.  · You have a fever.  Get help right away if:  · You cannot eat or keep fluids down.  · Your pain becomes very bad.  · Your skin or the white part of your eyes turns yellow (jaundice).  · You throw up (vomit).  · You feel dizzy or you pass out (faint).  · Your blood sugar is high (over 300 mg/dL).  This information is not intended to replace advice given to you by your health care provider. Make sure you discuss any questions you have with your health care provider.  Document Released: 06/05/2009 Document Revised: 05/25/2017 Document Reviewed: 09/20/2016  © 2017 [employer]      Depression / Suicide Risk    As you are discharged from this [facility] facility, it is important to learn how to keep safe from harming yourself.    Recognize the warning signs:  · Abrupt changes in personality, positive or negative- including increase in energy   · Giving away possessions  · Change in eating patterns- significant weight changes-  positive or negative  · Change in sleeping patterns- unable to sleep or sleeping all the time   · Unwillingness or inability to communicate  · Depression  · Unusual sadness, discouragement and loneliness  · Talk of wanting to die  · Neglect of personal appearance   · Rebelliousness- reckless behavior  · Withdrawal from people/activities they love  · Confusion- inability to concentrate     If you or a loved one observes any of these  behaviors or has concerns about self-harm, here's what you can do:  · Talk about it- your feelings and reasons for harming yourself  · Remove any means that you might use to hurt yourself (examples: pills, rope, extension cords, firearm)  · Get professional help from the community (Mental Health, Substance Abuse, psychological counseling)  · Do not be alone:Call your Safe Contact- someone whom you trust who will be there for you.  · Call your local CRISIS HOTLINE 707-5754 or 907-143-0687  · Call your local Children's Mobile Crisis Response Team [place] (125) 529-6957 or www.PeopleString  · Call the toll free National Suicide Prevention Hotlines   · National Suicide Prevention Lifeline 111-629-HGSJ (6525)  · National Hope Line Network 800-SUICIDE (852-8039)

## 2019-01-11 NOTE — CARE PLAN
Problem: Knowledge Deficit  Goal: Knowledge of disease process/condition, treatment plan, diagnostic tests, and medications will improve    Intervention: Explain information regarding disease process/condition, treatment plan, diagnostic tests, and medications and document in education  Patient educated about constipation risk when taking narcotics.      Problem: Pain Management  Goal: Pain level will decrease to patient's comfort goal    Intervention: Follow pain managment plan developed in collaboration with patient and Interdisciplinary Team  Patient medicated per MAR for pain management.

## 2019-01-11 NOTE — RESPIRATORY CARE
COPD EDUCATION by COPD CLINICAL EDUCATOR  1/11/2019 at 6:50 AM by Amber Jovel     Patient reviewed by COPD education team. Patient does not qualify for COPD program.

## 2019-01-11 NOTE — PROGRESS NOTES
Received report from night RN, assumed care at 0700. Pt A&OX4, able to specify needs. Pt very tangential in thought. Pt with grandiose thoughts and talking about conspiracies. Pt now on regular diet. Pt with reports of pain to right kidney. Pt up self, maintains steady gait. Fall precautions in place. POC discussed, communication board updated. Call light in reach, hourly rounding in place.

## 2019-01-11 NOTE — CARE PLAN
Problem: Nutritional:  Goal: Achieve adequate nutritional intake  Patient will consume 50% of meals  Outcome: MET Date Met: 01/11/19  Consistent good PO of meals and tolerating at >75% intake. No c/o at this time. RD available PRN.

## 2019-01-11 NOTE — PROGRESS NOTES
"Assumed care at 1900. Received report from RN. Patient is AOx4. Patient complains of pain in abdomen and back. PRN pain meds being given. Steady gait noted. IVF running. Assessment complete. Labs reviewed. Patient and RN discussed plan of care. Patient questions answered. Patient needs are met at this time. Bed in lowest and locked position. Call light is within reach. Hourly rounding in place. /78   Pulse 68   Temp 36.3 °C (97.3 °F) (Temporal)   Resp 17   Ht 1.803 m (5' 11\")   Wt 88.9 kg (195 lb 15.8 oz)   SpO2 93%   BMI 27.33 kg/m²       "

## 2019-01-11 NOTE — DISCHARGE SUMMARY
Discharge Summary    CHIEF COMPLAINT ON ADMISSION  No chief complaint on file.      Reason for Admission  Pancreatitis     Admission Date  1/10/2019    CODE STATUS  Full Code    HPI & HOSPITAL COURSE  Please refer to my H&P dated 1/10 for further details. This is a 58 y.o. male with PMHx of pancreatic divisum, recurrent pancreatitis, medication non compliance [facility]. He has been unsuccessful following up with GI consultants for planned interventions due [facility] with [facility]. I discussed case with Dr. Ortiz ([facility]) who stated no intervention would be possible at this time due to patient's acute pancreatitis and that this would need to wait several weeks after resolution and follow up with [facility] as an outpatient. I counseled patient on importance of tobacco cessation as this is definitely not helping his symptoms. He has non compliant with his medications and has been counseled on this as well.         Therefore, he is discharged in good and stable condition to home with close outpatient follow-up.    The patient recovered much more quickly than anticipated on admission.    Discharge Date  1/11/19     FOLLOW UP ITEMS POST DISCHARGE  [facility] for appropriate outpatient management    DISCHARGE DIAGNOSES  Principal Problem (Resolved):    Acute on chronic pancreatitis (HCC) POA: Yes  Active Problems:    Pancreatic divisum POA: Yes    Tobacco abuse POA: Unknown      FOLLOW UP  No future appointments.  [address] [place] [id number] [phone]    please call to arrange follow up appointment. thank you      MEDICATIONS ON DISCHARGE     Medication List      CONTINUE taking these medications      Instructions   methocarbamol 500 MG Tabs  Commonly known as:  ROBAXIN   Take 1,000 mg by mouth 4 times a day.  Dose:  1000 mg     omeprazole 20 MG delayed-release  "capsule  Commonly known as:  PRILOSEC   Take 1 Cap by mouth every day.  Dose:  20 mg     oxyCODONE immediate-release 5 MG Tabs  Commonly known as:  ROXICODONE   Take 5 mg by mouth every four hours as needed for Severe Pain.  Dose:  5 mg     pancrelipase (Lip-Prot-Amyl) 6000 units Cpep  Commonly known as:  CREON 6000   Take 1 Cap by mouth 3 times a day, with meals.  Dose:  1 Cap            Allergies  Allergies   Allergen Reactions   • Codeine Hives   • Tylenol Unspecified     \"Tylenol makes me violent.\"   • Darvocet [Propoxyphene N-Apap] Hives   • Elavil [Amitriptyline] Anaphylaxis   • Hydrocodone Anaphylaxis       DIET  Orders Placed This Encounter   Procedures   • Diet Order Regular     Standing Status:   Standing     Number of Occurrences:   1     Order Specific Question:   Diet:     Answer:   Regular [1]       ACTIVITY  As tolerated.  Weight bearing as tolerated    CONSULTATIONS  none    PROCEDURES  none    LABORATORY  Lab Results   Component Value Date    SODIUM 140 01/11/2019    POTASSIUM 4.3 01/11/2019    CHLORIDE 114 (H) 01/11/2019    CO2 18 (L) 01/11/2019    GLUCOSE 73 01/11/2019    BUN 8 01/11/2019    CREATININE 1.07 01/11/2019        Lab Results   Component Value Date    WBC 9.5 01/11/2019    HEMOGLOBIN 15.2 01/11/2019    HEMATOCRIT 44.7 01/11/2019    PLATELETCT 338 01/11/2019        Total time of the discharge process exceeds 31 minutes.  "

## 2019-09-30 PROBLEM — M75.112 NONTRAUMATIC INCOMPLETE BILATERAL ROTATOR CUFF TEAR: Status: ACTIVE | Noted: 2019-09-30

## 2019-09-30 PROBLEM — M75.111 NONTRAUMATIC INCOMPLETE BILATERAL ROTATOR CUFF TEAR: Status: ACTIVE | Noted: 2019-09-30

## 2020-01-28 PROBLEM — M25.511 ACUTE PAIN OF RIGHT SHOULDER: Status: ACTIVE | Noted: 2020-01-28
